# Patient Record
Sex: FEMALE | Race: BLACK OR AFRICAN AMERICAN | NOT HISPANIC OR LATINO | Employment: OTHER | URBAN - METROPOLITAN AREA
[De-identification: names, ages, dates, MRNs, and addresses within clinical notes are randomized per-mention and may not be internally consistent; named-entity substitution may affect disease eponyms.]

---

## 2017-12-30 ENCOUNTER — HOSPITAL ENCOUNTER (INPATIENT)
Facility: HOSPITAL | Age: 82
LOS: 1 days | Discharge: HOME | DRG: 312 | End: 2017-12-31
Attending: EMERGENCY MEDICINE | Admitting: HOSPITALIST
Payer: MEDICARE

## 2017-12-30 DIAGNOSIS — N19 RENAL FAILURE, UNSPECIFIED CHRONICITY: ICD-10-CM

## 2017-12-30 DIAGNOSIS — R55 SYNCOPE, UNSPECIFIED SYNCOPE TYPE: Primary | ICD-10-CM

## 2017-12-30 PROBLEM — D69.6 THROMBOCYTOPENIA: Status: ACTIVE | Noted: 2017-12-30

## 2017-12-30 PROBLEM — I95.1 ORTHOSTATIC HYPOTENSION: Status: ACTIVE | Noted: 2017-12-30

## 2017-12-30 PROBLEM — R79.89 PRERENAL AZOTEMIA: Status: ACTIVE | Noted: 2017-12-30

## 2017-12-30 PROBLEM — I10 ESSENTIAL HYPERTENSION: Status: ACTIVE | Noted: 2017-12-30

## 2017-12-30 PROBLEM — E87.6 HYPOKALEMIA: Status: ACTIVE | Noted: 2017-12-30

## 2017-12-30 PROBLEM — R79.89 ELEVATED SERUM CREATININE: Status: ACTIVE | Noted: 2017-12-30

## 2017-12-30 LAB
ALBUMIN SERPL BCP-MCNC: 2.8 G/DL
ALP SERPL-CCNC: 47 U/L
ALT SERPL W/O P-5'-P-CCNC: 9 U/L
ANION GAP SERPL CALC-SCNC: 13 MMOL/L
AST SERPL-CCNC: 23 U/L
BASOPHILS # BLD AUTO: 0.02 K/UL
BASOPHILS NFR BLD: 0.5 %
BILIRUB SERPL-MCNC: 0.6 MG/DL
BNP SERPL-MCNC: 46 PG/ML
BUN SERPL-MCNC: 40 MG/DL
CALCIUM SERPL-MCNC: 9 MG/DL
CHLORIDE SERPL-SCNC: 102 MMOL/L
CO2 SERPL-SCNC: 23 MMOL/L
CREAT SERPL-MCNC: 2.1 MG/DL
DIFFERENTIAL METHOD: ABNORMAL
EOSINOPHIL # BLD AUTO: 0 K/UL
EOSINOPHIL NFR BLD: 0.3 %
ERYTHROCYTE [DISTWIDTH] IN BLOOD BY AUTOMATED COUNT: 13.3 %
EST. GFR  (AFRICAN AMERICAN): 24 ML/MIN/1.73 M^2
EST. GFR  (NON AFRICAN AMERICAN): 21 ML/MIN/1.73 M^2
GLUCOSE SERPL-MCNC: 134 MG/DL
HCT VFR BLD AUTO: 44.3 %
HGB BLD-MCNC: 13.9 G/DL
LYMPHOCYTES # BLD AUTO: 1.1 K/UL
LYMPHOCYTES NFR BLD: 28.4 %
MCH RBC QN AUTO: 30.8 PG
MCHC RBC AUTO-ENTMCNC: 31.4 G/DL
MCV RBC AUTO: 98 FL
MONOCYTES # BLD AUTO: 0.4 K/UL
MONOCYTES NFR BLD: 9.3 %
NEUTROPHILS # BLD AUTO: 2.4 K/UL
NEUTROPHILS NFR BLD: 61.2 %
PLATELET # BLD AUTO: 95 K/UL
PMV BLD AUTO: 11.9 FL
POCT GLUCOSE: 124 MG/DL (ref 70–110)
POTASSIUM SERPL-SCNC: 3.2 MMOL/L
PROT SERPL-MCNC: 7.3 G/DL
RBC # BLD AUTO: 4.52 M/UL
SODIUM SERPL-SCNC: 138 MMOL/L
TROPONIN I SERPL DL<=0.01 NG/ML-MCNC: 0.05 NG/ML
TROPONIN I SERPL DL<=0.01 NG/ML-MCNC: 0.05 NG/ML
WBC # BLD AUTO: 3.88 K/UL

## 2017-12-30 PROCEDURE — 93010 ELECTROCARDIOGRAM REPORT: CPT | Mod: ,,, | Performed by: INTERNAL MEDICINE

## 2017-12-30 PROCEDURE — 96361 HYDRATE IV INFUSION ADD-ON: CPT

## 2017-12-30 PROCEDURE — 25000003 PHARM REV CODE 250: Performed by: HOSPITALIST

## 2017-12-30 PROCEDURE — 84484 ASSAY OF TROPONIN QUANT: CPT

## 2017-12-30 PROCEDURE — 25000003 PHARM REV CODE 250: Performed by: EMERGENCY MEDICINE

## 2017-12-30 PROCEDURE — 96360 HYDRATION IV INFUSION INIT: CPT

## 2017-12-30 PROCEDURE — 93005 ELECTROCARDIOGRAM TRACING: CPT

## 2017-12-30 PROCEDURE — 93010 ELECTROCARDIOGRAM REPORT: CPT | Mod: 77,S$GLB,, | Performed by: INTERNAL MEDICINE

## 2017-12-30 PROCEDURE — 80053 COMPREHEN METABOLIC PANEL: CPT

## 2017-12-30 PROCEDURE — 83880 ASSAY OF NATRIURETIC PEPTIDE: CPT

## 2017-12-30 PROCEDURE — 99285 EMERGENCY DEPT VISIT HI MDM: CPT | Mod: 25

## 2017-12-30 PROCEDURE — 85025 COMPLETE CBC W/AUTO DIFF WBC: CPT

## 2017-12-30 PROCEDURE — 84484 ASSAY OF TROPONIN QUANT: CPT | Mod: 91

## 2017-12-30 PROCEDURE — 11000001 HC ACUTE MED/SURG PRIVATE ROOM

## 2017-12-30 PROCEDURE — 36415 COLL VENOUS BLD VENIPUNCTURE: CPT

## 2017-12-30 RX ORDER — ASPIRIN 81 MG/1
81 TABLET ORAL DAILY
COMMUNITY

## 2017-12-30 RX ORDER — SODIUM CHLORIDE 9 MG/ML
INJECTION, SOLUTION INTRAVENOUS CONTINUOUS
Status: DISCONTINUED | OUTPATIENT
Start: 2017-12-30 | End: 2017-12-30

## 2017-12-30 RX ORDER — PANTOPRAZOLE SODIUM 40 MG/1
40 TABLET, DELAYED RELEASE ORAL DAILY
Status: DISCONTINUED | OUTPATIENT
Start: 2017-12-31 | End: 2017-12-30

## 2017-12-30 RX ORDER — ONDANSETRON 4 MG/1
4 TABLET, ORALLY DISINTEGRATING ORAL EVERY 8 HOURS PRN
Status: DISCONTINUED | OUTPATIENT
Start: 2017-12-30 | End: 2017-12-31 | Stop reason: HOSPADM

## 2017-12-30 RX ORDER — NITROGLYCERIN 0.4 MG/1
0.4 TABLET SUBLINGUAL
Status: DISCONTINUED | OUTPATIENT
Start: 2017-12-30 | End: 2017-12-30

## 2017-12-30 RX ORDER — POTASSIUM CHLORIDE 20 MEQ/1
20 TABLET, EXTENDED RELEASE ORAL ONCE
Status: COMPLETED | OUTPATIENT
Start: 2017-12-30 | End: 2017-12-30

## 2017-12-30 RX ORDER — ACETAMINOPHEN 325 MG/1
650 TABLET ORAL EVERY 6 HOURS PRN
Status: DISCONTINUED | OUTPATIENT
Start: 2017-12-30 | End: 2017-12-31 | Stop reason: HOSPADM

## 2017-12-30 RX ORDER — GUAIFENESIN 100 MG/5ML
200 SOLUTION ORAL 4 TIMES DAILY PRN
COMMUNITY
End: 2020-01-16

## 2017-12-30 RX ORDER — SODIUM CHLORIDE 9 MG/ML
INJECTION, SOLUTION INTRAVENOUS CONTINUOUS
Status: DISCONTINUED | OUTPATIENT
Start: 2017-12-30 | End: 2017-12-31

## 2017-12-30 RX ORDER — ASPIRIN 81 MG/1
81 TABLET ORAL DAILY
Status: DISCONTINUED | OUTPATIENT
Start: 2017-12-31 | End: 2017-12-31 | Stop reason: HOSPADM

## 2017-12-30 RX ORDER — CARVEDILOL 25 MG/1
1 TABLET ORAL 2 TIMES DAILY
COMMUNITY
End: 2020-01-16 | Stop reason: SDUPTHER

## 2017-12-30 RX ORDER — LOSARTAN POTASSIUM 25 MG/1
1 TABLET ORAL
COMMUNITY
End: 2020-01-16 | Stop reason: SDUPTHER

## 2017-12-30 RX ORDER — FUROSEMIDE 40 MG/1
40 TABLET ORAL DAILY
Status: ON HOLD | COMMUNITY
End: 2017-12-31

## 2017-12-30 RX ORDER — CARVEDILOL 25 MG/1
25 TABLET ORAL 2 TIMES DAILY
Status: DISCONTINUED | OUTPATIENT
Start: 2017-12-30 | End: 2017-12-31 | Stop reason: HOSPADM

## 2017-12-30 RX ADMIN — SODIUM CHLORIDE 1000 ML: 0.9 INJECTION, SOLUTION INTRAVENOUS at 01:12

## 2017-12-30 RX ADMIN — SODIUM CHLORIDE: 0.9 INJECTION, SOLUTION INTRAVENOUS at 08:12

## 2017-12-30 RX ADMIN — CARVEDILOL 25 MG: 25 TABLET, FILM COATED ORAL at 08:12

## 2017-12-30 RX ADMIN — SODIUM CHLORIDE: 0.9 INJECTION, SOLUTION INTRAVENOUS at 06:12

## 2017-12-30 RX ADMIN — POTASSIUM CHLORIDE 20 MEQ: 20 TABLET, EXTENDED RELEASE ORAL at 08:12

## 2017-12-30 RX ADMIN — GUAIFENESIN AND DEXTROMETHORPHAN HYDROBROMIDE 1 TABLET: 600; 30 TABLET, EXTENDED RELEASE ORAL at 08:12

## 2017-12-30 NOTE — ED NOTES
APPEARANCE: Alert, oriented and in no acute distress.  CARDIAC: Normal rate and rhythm, no murmur heard.   PERIPHERAL VASCULAR: peripheral pulses present. Normal cap refill. No edema. Warm to touch.    RESPIRATORY:Normal rate and effort, breath sounds clear bilaterally throughout chest. Respirations are equal and unlabored no obvious signs of distress.  GASTRO: soft, bowel sounds normal, no tenderness, no abdominal distention.  MUSC: Full ROM. No bony tenderness or soft tissue tenderness. No obvious deformity. No palpable head trauma.   SKIN: Skin is warm and dry, normal skin turgor, mucous membranes moist.  NEURO: 5/5 strength major flexors/extensors bilaterally. Sensory intact to light touch bilaterally. [place] coma scale: eyes open spontaneously-4, oriented & converses-5, obeys commands-6. No neurological abnormalities. + LOC  MENTAL STATUS: awake, alert and aware of environment.  EYE: PERRL, both eyes: pupils brisk and reactive to light. Normal size.  ENT: EARS: no obvious drainage. NOSE: no active bleeding.

## 2017-12-30 NOTE — ED PROVIDER NOTES
Encounter Date: 12/30/2017    SCRIBE #1 NOTE: I, Dayron Brown, am scribing for, and in the presence of,  Dr. Madhav Childers. I have scribed the entire note.       History     Chief Complaint   Patient presents with    Loss of Consciousness     pt presents to eD today via EMS. witnessed syncopale episode. pt unresponsive upon EMS arrival . repsonded to painful stimuli. initial BP 80/50 HR 72. pt arrives AAO      This is a 88 y.o. female who has a past medical history of Hypertension.   The patient presents to the Emergency Department via EMS, s/p near syncopal episode.   Daughter explained she woke her up this morning and was normal, made her breakfast and upon her arrival to the kitchen, she was very slow to respond to questions or commands.   Daughter reports pt was sitting up straight, but decreased responsiveness for 30 minutes at which time she called EMS.   Symptoms are also associated with cough, fever for the last 5 days.   Family denies falling syncopal episode, rather a decreased responsiveness and near syncopal episode.  Patient has no prior history of similar symptoms.   No hx of hypoglycemia.  Pt has no past surgical history on file.        The history is provided by the patient and a relative.     Review of patient's allergies indicates:  No Known Allergies  Past Medical History:   Diagnosis Date    Diabetes mellitus     Hypertension      Past Surgical History:   Procedure Laterality Date    CATARACT EXTRACTION EXTRACAPSULAR W/ INTRAOCULAR LENS IMPLANTATION Bilateral      Family History   Problem Relation Age of Onset    Cancer Mother     Heart disease Mother     Stroke Father     COPD Father      Social History   Substance Use Topics    Smoking status: Never Smoker    Smokeless tobacco: Never Used    Alcohol use No     Review of Systems   Constitutional: Negative for fever.   HENT: Positive for congestion and sore throat.    Respiratory: Positive for cough and shortness of breath.     Cardiovascular: Negative for chest pain.   Gastrointestinal: Negative for nausea.   Genitourinary: Negative for dysuria.   Musculoskeletal: Negative for back pain.   Skin: Negative for rash.   Neurological: Positive for syncope (one episode, decreased responsiveness). Negative for dizziness, weakness and light-headedness.   Hematological: Does not bruise/bleed easily.       Physical Exam     Initial Vitals   BP Pulse Resp Temp SpO2   12/30/17 1109 12/30/17 1109 -- 12/30/17 1109 12/30/17 1130   (!) 126/58 74  98.7 °F (37.1 °C) 100 %      MAP       12/30/17 1109       80.67         Physical Exam    Nursing note and vitals reviewed.  Constitutional: She appears well-developed and well-nourished. She is not diaphoretic. No distress.   HENT:   Head: Normocephalic and atraumatic.   Dry oral mucosa   Eyes: Pupils are equal, round, and reactive to light.   Neck: Normal range of motion. Neck supple.   Cardiovascular: Normal rate, regular rhythm and normal heart sounds.   Pulmonary/Chest:   Crackles at bases with wheezing   Abdominal: She exhibits no distension.   Musculoskeletal: Normal range of motion.   Neurological: She is alert.   Oriented x2  Answers questions appropriately.   Skin: Skin is dry.         ED Course   Procedures  Labs Reviewed   CBC W/ AUTO DIFFERENTIAL - Abnormal; Notable for the following:        Result Value    WBC 3.88 (*)     MCHC 31.4 (*)     Platelets 95 (*)     All other components within normal limits   TROPONIN I - Abnormal; Notable for the following:     Troponin I 0.052 (*)     All other components within normal limits   COMPREHENSIVE METABOLIC PANEL - Abnormal; Notable for the following:     Potassium 3.2 (*)     Glucose 134 (*)     BUN, Bld 40 (*)     Creatinine 2.1 (*)     Albumin 2.8 (*)     Alkaline Phosphatase 47 (*)     ALT 9 (*)     eGFR if  24 (*)     eGFR if non  21 (*)     All other components within normal limits   B-TYPE NATRIURETIC PEPTIDE     EKG  Readings: (Independently Interpreted)   sinus rhythm with PAC at HR 67, otherwise nl. no previous EKG on file.     X-Ray Chest PA And Lateral   Final Result    Cardiomegaly likely chronic emphysematous and interstitial changes. Bibasilar opacity, likely atelectasis or scarring. Pneumonitis possible particularly at the left lung base.         Electronically signed by: Dr. Stevan Vega   Date:     12/30/17   Time:    12:36              Medical Decision Making:   History:   Old Medical Records: I decided to obtain old medical records.  Old Records Summarized: records from another hospital.       <> Summary of Records:   prior records from  1/11/17   creatinine 1.3   BUN 23  Initial Assessment:   Presents with near syncopal episode, transient change in Mental status.  Differential: orthostatic hypotension, vasovagal, arrhythmia.  Plan for cbc, CMP, bmp, orthostatics, troponin, cxr, cardiac monitoring  Clinical Tests:   Lab Tests: Ordered and Reviewed  Radiological Study: Ordered and Reviewed  ED Management:  Based on labs, pt has mild acute kidney injury and is slightly orthostatic. Will require fluid resuscitation and cardiac monitoring, possibly echo and syncopal workup, such as carotid dopplers, believe she will need observation.  consult with Dr. Cazares at [facility], who will accept to his service.                       ED Course      Clinical Impression:   The primary encounter diagnosis was Syncope, unspecified syncope type. A diagnosis of Renal failure, unspecified chronicity was also pertinent to this visit.    Disposition:   Disposition: Admitted    I, Dr. Madhav Childers, personally performed the services described in this documentation.   All medical record entries made by the scribe were at my direction and in my presence.   I have reviewed the chart and agree that the record is accurate and complete.   Madhav Childers MD.                      Madhav Childers MD  01/09/18 0973       Madhav PRAKASH  MD [place]  01/09/18 0917

## 2017-12-31 VITALS
WEIGHT: 151 LBS | OXYGEN SATURATION: 92 % | RESPIRATION RATE: 18 BRPM | DIASTOLIC BLOOD PRESSURE: 53 MMHG | TEMPERATURE: 96 F | HEIGHT: 67 IN | BODY MASS INDEX: 23.7 KG/M2 | SYSTOLIC BLOOD PRESSURE: 118 MMHG | HEART RATE: 66 BPM

## 2017-12-31 PROBLEM — N17.9 AKI (ACUTE KIDNEY INJURY): Status: ACTIVE | Noted: 2017-12-30

## 2017-12-31 PROBLEM — R79.89 PRERENAL AZOTEMIA: Status: RESOLVED | Noted: 2017-12-30 | Resolved: 2017-12-31

## 2017-12-31 LAB
ANION GAP SERPL CALC-SCNC: 11 MMOL/L
BUN SERPL-MCNC: 32 MG/DL
CALCIUM SERPL-MCNC: 8.3 MG/DL
CHLORIDE SERPL-SCNC: 106 MMOL/L
CO2 SERPL-SCNC: 23 MMOL/L
CREAT SERPL-MCNC: 1.3 MG/DL
EST. GFR  (AFRICAN AMERICAN): 42 ML/MIN/1.73 M^2
EST. GFR  (NON AFRICAN AMERICAN): 37 ML/MIN/1.73 M^2
GLUCOSE SERPL-MCNC: 96 MG/DL
MAGNESIUM SERPL-MCNC: 1.7 MG/DL
PHOSPHATE SERPL-MCNC: 2.4 MG/DL
POTASSIUM SERPL-SCNC: 3.2 MMOL/L
SODIUM SERPL-SCNC: 140 MMOL/L
TROPONIN I SERPL DL<=0.01 NG/ML-MCNC: 0.05 NG/ML

## 2017-12-31 PROCEDURE — 84484 ASSAY OF TROPONIN QUANT: CPT

## 2017-12-31 PROCEDURE — 80048 BASIC METABOLIC PNL TOTAL CA: CPT

## 2017-12-31 PROCEDURE — 83735 ASSAY OF MAGNESIUM: CPT

## 2017-12-31 PROCEDURE — 84100 ASSAY OF PHOSPHORUS: CPT

## 2017-12-31 PROCEDURE — 94761 N-INVAS EAR/PLS OXIMETRY MLT: CPT

## 2017-12-31 PROCEDURE — 25000003 PHARM REV CODE 250: Performed by: HOSPITALIST

## 2017-12-31 RX ORDER — POTASSIUM CHLORIDE 20 MEQ/1
40 TABLET, EXTENDED RELEASE ORAL ONCE
Status: COMPLETED | OUTPATIENT
Start: 2017-12-31 | End: 2017-12-31

## 2017-12-31 RX ORDER — SODIUM,POTASSIUM PHOSPHATES 280-250MG
2 POWDER IN PACKET (EA) ORAL
Status: DISCONTINUED | OUTPATIENT
Start: 2017-12-31 | End: 2017-12-31 | Stop reason: HOSPADM

## 2017-12-31 RX ORDER — FUROSEMIDE 40 MG/1
40 TABLET ORAL DAILY PRN
Start: 2017-12-31 | End: 2020-01-16 | Stop reason: SDUPTHER

## 2017-12-31 RX ADMIN — GUAIFENESIN AND DEXTROMETHORPHAN HYDROBROMIDE 1 TABLET: 600; 30 TABLET, EXTENDED RELEASE ORAL at 09:12

## 2017-12-31 RX ADMIN — ASPIRIN 81 MG: 81 TABLET, COATED ORAL at 09:12

## 2017-12-31 RX ADMIN — POTASSIUM & SODIUM PHOSPHATES POWDER PACK 280-160-250 MG 2 PACKET: 280-160-250 PACK at 09:12

## 2017-12-31 RX ADMIN — CARVEDILOL 25 MG: 25 TABLET, FILM COATED ORAL at 09:12

## 2017-12-31 RX ADMIN — POTASSIUM CHLORIDE 40 MEQ: 20 TABLET, EXTENDED RELEASE ORAL at 09:12

## 2017-12-31 NOTE — ASSESSMENT & PLAN NOTE
Orthostatic hypotension  Elevated troponin  She has continued taking her home antihypertensives and furosemide while having decreased oral intake and diarrhea. This likely lead to the dehydration and the orthostasis. Will continue IV fluids. Holding home losartan and furosemide. Repeat orthostatics tomorrow. Telemetry monitoring. Trend troponin. No ischemic changes on her ECG. Monitor. Follows with Cardiology at [facility], so will need to follow up with them after discharge.

## 2017-12-31 NOTE — ASSESSMENT & PLAN NOTE
Prerenal azotemia  Do not have a baseline creatinine here. Repeat after giving IV fluids. Encourage oral intake.

## 2017-12-31 NOTE — SUBJECTIVE & OBJECTIVE
Past Medical History:   Diagnosis Date    Diabetes mellitus     Hypertension        Past Surgical History:   Procedure Laterality Date    CATARACT EXTRACTION EXTRACAPSULAR W/ INTRAOCULAR LENS IMPLANTATION Bilateral        Review of patient's allergies indicates:  No Known Allergies    PTA Medications   Medication Sig    aspirin (ECOTRIN) 81 MG EC tablet Take 81 mg by mouth once daily.    carvedilol (COREG) 25 MG tablet Take 1 tablet by mouth 2 (two) times daily.     furosemide (LASIX) 40 MG tablet Take 40 mg by mouth once daily.    guaifenesin 100 mg/5 ml (ROBITUSSIN) 100 mg/5 mL syrup Take 200 mg by mouth 4 (four) times daily as needed for Cough.    losartan (COZAAR) 25 MG tablet Take 1 tablet by mouth with lunch.        Family History     Mom - Cancer, Heart disease  Dad - COPD, Stroke        Social History Main Topics    Smoking status: Never Smoker    Smokeless tobacco: Never Used    Alcohol use No    Drug use: No    Sexual activity: Not on file     Review of Systems   Constitutional: Positive for appetite change. Negative for diaphoresis and fever.   HENT: Positive for congestion. Negative for nosebleeds, sinus pressure and sore throat.    Eyes: Negative for pain and itching.   Respiratory: Positive for cough. Negative for shortness of breath and wheezing.    Cardiovascular: Negative for chest pain, palpitations and leg swelling.   Gastrointestinal: Positive for diarrhea. Negative for abdominal pain, blood in stool, nausea and vomiting.   Endocrine: Negative for cold intolerance, polydipsia and polyphagia.   Genitourinary: Negative for difficulty urinating, dysuria, frequency and hematuria.   Musculoskeletal: Negative for arthralgias, back pain and myalgias.   Skin: Negative for pallor and rash.   Allergic/Immunologic: Negative for environmental allergies and food allergies.   Neurological: Positive for syncope. Negative for dizziness, light-headedness and headaches.   Hematological: Does not  bruise/bleed easily.   Psychiatric/Behavioral: Negative for agitation and confusion. The patient is not nervous/anxious.      Objective:     Vital Signs (Most Recent):  Temp: 99.8 °F (37.7 °C) (12/30/17 1915)  Pulse: 76 (12/30/17 1915)  Resp: 18 (12/30/17 1915)  BP: (!) 176/81 (12/30/17 1915)  SpO2: 96 % (12/30/17 1722) Vital Signs (24h Range):  Temp:  [97.5 °F (36.4 °C)-99.8 °F (37.7 °C)] 99.8 °F (37.7 °C)  Pulse:  [65-78] 76  Resp:  [18-24] 18  SpO2:  [96 %-100 %] 96 %  BP: ()/(45-83) 176/81     Weight: 68.5 kg (151 lb 0.2 oz)  Body mass index is 23.65 kg/m².    Physical Exam   Constitutional: She is oriented to person, place, and time. She appears well-developed. She is cooperative. No distress.   HENT:   Head: Normocephalic and atraumatic.   Right Ear: External ear normal.   Left Ear: External ear normal.   Nose: No mucosal edema or sinus tenderness.   Mouth/Throat: Uvula is midline and oropharynx is clear and moist. Mucous membranes are dry. She has dentures. No oropharyngeal exudate.   Eyes: Conjunctivae and EOM are normal. Pupils are equal, round, and reactive to light. No scleral icterus.   Neck: Phonation normal. Neck supple. No JVD present. No muscular tenderness present. Carotid bruit is not present. No tracheal deviation present.   Cardiovascular: Normal rate, regular rhythm, S1 normal and S2 normal.   Extrasystoles are present.   No murmur heard.  Pulses:       Radial pulses are 2+ on the right side, and 2+ on the left side.   Pulmonary/Chest: Effort normal and breath sounds normal. No respiratory distress. She has no wheezes. She has no rales. She exhibits no tenderness and no crepitus.   Abdominal: Soft. Bowel sounds are normal. She exhibits no distension. There is no tenderness. There is no rebound and no guarding. No hernia.   Musculoskeletal: She exhibits no edema or tenderness.   Lymphadenopathy:        Right cervical: No superficial cervical adenopathy present.       Left cervical: No  superficial cervical adenopathy present.        Right: No supraclavicular adenopathy present.        Left: No supraclavicular adenopathy present.   Neurological: She is alert and oriented to person, place, and time. She displays no tremor. She displays no seizure activity.   Skin: Skin is warm and dry. No rash noted. No cyanosis or erythema. Nails show no clubbing.   Psychiatric: She has a normal mood and affect. Her behavior is normal. Thought content normal.   Nursing note and vitals reviewed.        CRANIAL NERVES     CN III, IV, VI   Pupils are equal, round, and reactive to light.  Extraocular motions are normal.        Significant Labs:   CBC:   Recent Labs  Lab 12/30/17  1227   WBC 3.88*   HGB 13.9   HCT 44.3   PLT 95*     CMP:   Recent Labs  Lab 12/30/17  1306      K 3.2*      CO2 23   *   BUN 40*   CREATININE 2.1*   CALCIUM 9.0   PROT 7.3   ALBUMIN 2.8*   BILITOT 0.6   ALKPHOS 47*   AST 23   ALT 9*   ANIONGAP 13   EGFRNONAA 21*     Cardiac Markers:   Recent Labs  Lab 12/30/17  1306   BNP 46     Troponin:   Recent Labs  Lab 12/30/17  1306   TROPONINI 0.052*       Significant Imaging: CXR: I have reviewed all pertinent results/findings within the past 24 hours and my personal findings are:  No prior available for comparison. Cardiomegaly. Likely chronic institial scarring. Possible bibasilar atelectasis.   EKG: I have reviewed all pertinent results/findings within the past 24 hours and my personal findings are: Sinus rhythm with frequent PACs. No prior for comparison. No ST-T wave changes.

## 2017-12-31 NOTE — DISCHARGE INSTRUCTIONS
Near Syncope, Unknown (English) View Edit Remove  Dehydration (Adult) (English) View Edit Remove

## 2017-12-31 NOTE — PLAN OF CARE
Discharge orders noted, no HH or HME ordered.    Pt's nurse will go over medications/signs and symptoms prior to discharge       12/31/17 1514   Final Note   Assessment Type Final Discharge Note   Discharge Disposition Home   What phone number can be called within the next 1-3 days to see how you are doing after discharge? [phone]   Hospital Follow Up  Appt(s) scheduled? (pt to keep previously scheduled appt)   Right Care Referral Info   Post Acute Recommendation No Care     Katharine Laboy RN Transitional Navigator  [phone]

## 2017-12-31 NOTE — H&P
[facility]  History & Physical    Patient Name: Daria Carter  MRN: [id number]  Admission Date: 12/30/2017  Attending Physician: Gabino Cazares MD  Primary Care Provider: Doreen Kim MD         Patient information was obtained from patient, relative(s) and ER records.     Subjective:     Principal Problem:Syncope    Chief Complaint:   Chief Complaint   Patient presents with    Loss of Consciousness     pt presents to eD today via EMS. witnessed syncopale episode. pt unresponsive upon EMS arrival . repsonded to painful stimuli. initial BP 80/50 HR 72. pt arrives AAO         HPI: Ms. Carter is an 89 yo BF with HTN that [place] [facility] [place] have syncopal event at home. She says that she remembers walking from her room to the table for breakfast. She also reports remembering not feeding herself, but her daughter fed her everything and she ate it. She says that the next thing that she remembers is waking up with several men around her. She remembers hearing some of the conversation, but she was not talking much at that time. Ultimately, she was back to her normal self by the time she arrived at the ED. She reports having a cold for the last week and has been taking mucinex liquid at home for it. She had subjective fever until a couple days ago. Also had decreased appetite and oral intake over this time frame. She says that she had loose stools since starting the mucinex. She denies any chest pain or palpitations.     Past Medical History:   Diagnosis Date    Diabetes mellitus     Hypertension        Past Surgical History:   Procedure Laterality Date    CATARACT EXTRACTION EXTRACAPSULAR W/ INTRAOCULAR LENS IMPLANTATION Bilateral        Review of patient's allergies indicates:  No Known Allergies    PTA Medications   Medication Sig    aspirin (ECOTRIN) 81 MG EC tablet Take 81 mg by mouth once daily.    carvedilol (COREG) 25 MG tablet Take 1 tablet by mouth 2 (two) times  daily.     furosemide (LASIX) 40 MG tablet Take 40 mg by mouth once daily.    guaifenesin 100 mg/5 ml (ROBITUSSIN) 100 mg/5 mL syrup Take 200 mg by mouth 4 (four) times daily as needed for Cough.    losartan (COZAAR) 25 MG tablet Take 1 tablet by mouth with lunch.        Family History     Mom - Cancer, Heart disease  Dad - COPD, Stroke        Social History Main Topics    Smoking status: Never Smoker    Smokeless tobacco: Never Used    Alcohol use No    Drug use: No    Sexual activity: Not on file     Review of Systems   Constitutional: Positive for appetite change. Negative for diaphoresis and fever.   HENT: Positive for congestion. Negative for nosebleeds, sinus pressure and sore throat.    Eyes: Negative for pain and itching.   Respiratory: Positive for cough. Negative for shortness of breath and wheezing.    Cardiovascular: Negative for chest pain, palpitations and leg swelling.   Gastrointestinal: Positive for diarrhea. Negative for abdominal pain, blood in stool, nausea and vomiting.   Endocrine: Negative for cold intolerance, polydipsia and polyphagia.   Genitourinary: Negative for difficulty urinating, dysuria, frequency and hematuria.   Musculoskeletal: Negative for arthralgias, back pain and myalgias.   Skin: Negative for pallor and rash.   Allergic/Immunologic: Negative for environmental allergies and food allergies.   Neurological: Positive for syncope. Negative for dizziness, light-headedness and headaches.   Hematological: Does not bruise/bleed easily.   Psychiatric/Behavioral: Negative for agitation and confusion. The patient is not nervous/anxious.      Objective:     Vital Signs (Most Recent):  Temp: 99.8 °F (37.7 °C) (12/30/17 1915)  Pulse: 76 (12/30/17 1915)  Resp: 18 (12/30/17 1915)  BP: (!) 176/81 (12/30/17 1915)  SpO2: 96 % (12/30/17 1722) Vital Signs (24h Range):  Temp:  [97.5 °F (36.4 °C)-99.8 °F (37.7 °C)] 99.8 °F (37.7 °C)  Pulse:  [65-78] 76  Resp:  [18-24] 18  SpO2:  [96 %-100 %]  96 %  BP: ()/(45-83) 176/81     Weight: 68.5 kg (151 lb 0.2 oz)  Body mass index is 23.65 kg/m².    Physical Exam   Constitutional: She is oriented to person, place, and time. She appears well-developed. She is cooperative. No distress.   HENT:   Head: Normocephalic and atraumatic.   Right Ear: External ear normal.   Left Ear: External ear normal.   Nose: No mucosal edema or sinus tenderness.   Mouth/Throat: Uvula is midline and oropharynx is clear and moist. Mucous membranes are dry. She has dentures. No oropharyngeal exudate.   Eyes: Conjunctivae and EOM are normal. Pupils are equal, round, and reactive to light. No scleral icterus.   Neck: Phonation normal. Neck supple. No JVD present. No muscular tenderness present. Carotid bruit is not present. No tracheal deviation present.   Cardiovascular: Normal rate, regular rhythm, S1 normal and S2 normal.   Extrasystoles are present.   No murmur heard.  Pulses:       Radial pulses are 2+ on the right side, and 2+ on the left side.   Pulmonary/Chest: Effort normal and breath sounds normal. No respiratory distress. She has no wheezes. She has no rales. She exhibits no tenderness and no crepitus.   Abdominal: Soft. Bowel sounds are normal. She exhibits no distension. There is no tenderness. There is no rebound and no guarding. No hernia.   Musculoskeletal: She exhibits no edema or tenderness.   Lymphadenopathy:        Right cervical: No superficial cervical adenopathy present.       Left cervical: No superficial cervical adenopathy present.        Right: No supraclavicular adenopathy present.        Left: No supraclavicular adenopathy present.   Neurological: She is alert and oriented to person, place, and time. She displays no tremor. She displays no seizure activity.   Skin: Skin is warm and dry. No rash noted. No cyanosis or erythema. Nails show no clubbing.   Psychiatric: She has a normal mood and affect. Her behavior is normal. Thought content normal.   Nursing  note and vitals reviewed.        CRANIAL NERVES     CN III, IV, VI   Pupils are equal, round, and reactive to light.  Extraocular motions are normal.        Significant Labs:   CBC:   Recent Labs  Lab 12/30/17  1227   WBC 3.88*   HGB 13.9   HCT 44.3   PLT 95*     CMP:   Recent Labs  Lab 12/30/17  1306      K 3.2*      CO2 23   *   BUN 40*   CREATININE 2.1*   CALCIUM 9.0   PROT 7.3   ALBUMIN 2.8*   BILITOT 0.6   ALKPHOS 47*   AST 23   ALT 9*   ANIONGAP 13   EGFRNONAA 21*     Cardiac Markers:   Recent Labs  Lab 12/30/17  1306   BNP 46     Troponin:   Recent Labs  Lab 12/30/17  1306   TROPONINI 0.052*       Significant Imaging: CXR: I have reviewed all pertinent results/findings within the past 24 hours and my personal findings are:  No prior available for comparison. Cardiomegaly. Likely chronic institial scarring. Possible bibasilar atelectasis.   EKG: I have reviewed all pertinent results/findings within the past 24 hours and my personal findings are: Sinus rhythm with frequent PACs. No prior for comparison. No ST-T wave changes.    Assessment/Plan:     * Syncope    Orthostatic hypotension  Elevated troponin  She has continued taking her home antihypertensives and furosemide while having decreased oral intake and diarrhea. This likely lead to the dehydration and the orthostasis. Will continue IV fluids. Holding home losartan and furosemide. Repeat orthostatics tomorrow. Telemetry monitoring. Trend troponin. No ischemic changes on her ECG. Monitor. Follows with Cardiology at [facility], so will need to follow up with them after discharge.         Thrombocytopenia    Unknown baseline. No sign of bleeding. Monitor.           Elevated serum creatinine    Prerenal azotemia  Do not have a baseline creatinine here. Repeat after giving IV fluids. Encourage oral intake.         Hypokalemia    Give KCl 20 mEq with her elevated creatinine.           Essential hypertension    Holding home losartan and furosemide.  Continue carvedilol.           VTE Risk Mitigation         Ordered     Medium Risk of VTE  Once      12/30/17 1712     Place sequential compression device  Until discontinued      12/30/17 1712     Place CESILIA hose  Until discontinued      12/30/17 1712             Gabino Cazares MD  Department of Hospital Medicine   [facility]

## 2018-01-02 DIAGNOSIS — R55 SYNCOPE: Primary | ICD-10-CM

## 2018-01-03 NOTE — ASSESSMENT & PLAN NOTE
Orthostatic hypotension  Elevated troponin  She has continued taking her home antihypertensives and furosemide while having decreased oral intake and diarrhea. This likely lead to the dehydration and the orthostasis. Holding home losartan and furosemide. Telemetry monitoring was negative for any events. Troponin was negative. No ischemic changes on her ECG. Follows with Cardiology at [facility], so will need to follow up with them after discharge.

## 2018-01-03 NOTE — HOSPITAL COURSE
Given gentle IV hydration overnight. Blood pressure medications were reduced and furosemide was held. No events were noted on telemetry. Creatinine improved to 1.3 with holding the medications (furosemide and losartan) and the IV fluids. She was noted to be orthostatic by blood pressure changes but she denied symptoms. Will hold furosemide on discharge and resume losartan in a couple days. Will follow up with her PCP/Cardiologist.

## 2018-01-10 PROBLEM — N17.9 AKI (ACUTE KIDNEY INJURY): Status: RESOLVED | Noted: 2017-12-30 | Resolved: 2018-01-10

## 2018-01-10 PROBLEM — E87.6 HYPOKALEMIA: Status: RESOLVED | Noted: 2017-12-30 | Resolved: 2018-01-10

## 2018-01-10 NOTE — DISCHARGE SUMMARY
[facility] Hospital Medicine  Discharge Summary      Patient Name: Daria Carter  MRN: [id number]  Admission Date: 12/30/2017  Hospital Length of Stay: 1 days  Discharge Date and Time: 12/31/2017  4:19 PM  Attending Physician: Gabino Cazares MD   Discharging Provider: Gabino Cazares MD  Primary Care Provider: Doreen Kim MD      HPI:   Ms. Carter is an 89 yo BF with HTN that presented to [facility]- after have syncopal event at home. She says that she remembers walking from her room to the table for breakfast. She also reports remembering not feeding herself, but her daughter fed her everything and she ate it. She says that the next thing that she remembers is waking up with several men around her. She remembers hearing some of the conversation, but she was not talking much at that time. Ultimately, she was back to her normal self by the time she arrived at the ED. She reports having a cold for the last week and has been taking mucinex liquid at home for it. She had subjective fever until a couple days ago. Also had decreased appetite and oral intake over this time frame. She says that she had loose stools since starting the mucinex. She denies any chest pain or palpitations.     * No surgery found *      Hospital Course:   Given gentle IV hydration overnight. Blood pressure medications were reduced and furosemide was held. No events were noted on telemetry. Creatinine improved to 1.3 with holding the medications (furosemide and losartan) and the IV fluids. She was noted to be orthostatic by blood pressure changes but she denied symptoms. Will hold furosemide on discharge and resume losartan in a couple days. Will follow up with her PCP/Cardiologist.      Consults:     * Syncope    Orthostatic hypotension  Elevated troponin  She has continued taking her home antihypertensives and furosemide while having decreased oral intake and diarrhea. This likely lead to the dehydration and the orthostasis.  Holding home losartan and furosemide. Telemetry monitoring was negative for any events. Troponin was negative. No ischemic changes on her ECG. Follows with Cardiology at [facility], so will need to follow up with them after discharge.           Final Active Diagnoses:    Diagnosis Date Noted POA    PRINCIPAL PROBLEM:  Syncope [R55] 12/30/2017 Yes    Essential hypertension [I10] 12/30/2017 Yes    Orthostatic hypotension [I95.1] 12/30/2017 Yes    Thrombocytopenia [D69.6] 12/30/2017 Yes      Problems Resolved During this Admission:    Diagnosis Date Noted Date Resolved POA    Hypokalemia [E87.6] 12/30/2017 01/10/2018 Yes    SERGE (acute kidney injury) [N17.9] 12/30/2017 01/10/2018 Yes    Prerenal azotemia [R79.89] 12/30/2017 12/31/2017 Yes       Discharged Condition: good    Disposition: Home or Self Care    Follow Up:  Follow-up Information     Doreen Kim MD On 1/19/2018.    Specialty:  Cardiology  Why:  as previously scheduled.   Contact information:  [address] [place], [place] [id number] [phone]                 Patient Instructions:     Diet Cardiac (2gm sodium and 60gm fat)     Activity as tolerated     Notify your health care provider if you experience any of the following:  temperature >100.4     Notify your health care provider if you experience any of the following:  persistent nausea and vomiting or diarrhea     Notify your health care provider if you experience any of the following:  difficulty breathing or increased cough     Notify your health care provider if you experience any of the following:  persistent dizziness, light-headedness, or visual disturbances     Notify your health care provider if you experience any of the following:  increased confusion or weakness         Significant Diagnostic Studies: Radiology:   Imaging Results          X-Ray Chest PA And Lateral (Final result)  Result time 12/30/17 12:36:29    Final result by Stevan Vega MD (12/30/17 12:36:29)                  Impression:     Cardiomegaly likely chronic emphysematous and interstitial changes. Bibasilar opacity, likely atelectasis or scarring. Pneumonitis possible particularly at the left lung base.      Electronically signed by: Dr. Stevan Vega  Date:     12/30/17  Time:    12:36              Narrative:    Single view chest    Findings: Cardiac silhouette is enlarged. Mediastinum unremarkable. Lungs demonstrate suspected emphysematous changes with diffuse interstitial coarsening. Basilar opacity is present. Osseous structures are osteopenic.                                Pending Diagnostic Studies:     None         Medications:  Reconciled Home Medications:   Discharge Medication List as of 12/31/2017  3:59 PM      CONTINUE these medications which have CHANGED    Details   furosemide (LASIX) 40 MG tablet Take 1 tablet (40 mg total) by mouth daily as needed (for swelling or weight gain of 5 lbs.)., Starting Sun 12/31/2017, No Print         CONTINUE these medications which have NOT CHANGED    Details   aspirin (ECOTRIN) 81 MG EC tablet Take 81 mg by mouth once daily., Historical Med      carvedilol (COREG) 25 MG tablet Take 1 tablet by mouth 2 (two) times daily. , Historical Med      guaifenesin 100 mg/5 ml (ROBITUSSIN) 100 mg/5 mL syrup Take 200 mg by mouth 4 (four) times daily as needed for Cough., Historical Med      losartan (COZAAR) 25 MG tablet Take 1 tablet by mouth with lunch. , Historical Med             Indwelling Lines/Drains at time of discharge:   Lines/Drains/Airways          No matching active lines, drains, or airways          Time spent on the discharge of patient: 35 minutes  Patient was seen and examined on the date of discharge and determined to be suitable for discharge.         Gabino Cazares MD  Department of Hospital Medicine  [facility]

## 2020-01-16 ENCOUNTER — OFFICE VISIT (OUTPATIENT)
Dept: FAMILY MEDICINE | Facility: CLINIC | Age: 85
End: 2020-01-16
Payer: MEDICARE

## 2020-01-16 VITALS
WEIGHT: 131.94 LBS | HEART RATE: 62 BPM | BODY MASS INDEX: 20.67 KG/M2 | DIASTOLIC BLOOD PRESSURE: 88 MMHG | TEMPERATURE: 98 F | SYSTOLIC BLOOD PRESSURE: 140 MMHG | OXYGEN SATURATION: 93 %

## 2020-01-16 DIAGNOSIS — I10 ESSENTIAL HYPERTENSION: ICD-10-CM

## 2020-01-16 LAB
ALBUMIN SERPL BCP-MCNC: 3.5 G/DL (ref 3.5–5.2)
ALP SERPL-CCNC: 54 U/L (ref 55–135)
ALT SERPL W/O P-5'-P-CCNC: 7 U/L (ref 10–44)
ANION GAP SERPL CALC-SCNC: 9 MMOL/L (ref 8–16)
AST SERPL-CCNC: 22 U/L (ref 10–40)
BASOPHILS # BLD AUTO: 0.05 K/UL (ref 0–0.2)
BASOPHILS NFR BLD: 1.1 % (ref 0–1.9)
BILIRUB SERPL-MCNC: 0.8 MG/DL (ref 0.1–1)
BUN SERPL-MCNC: 18 MG/DL (ref 8–23)
CALCIUM SERPL-MCNC: 9.5 MG/DL (ref 8.7–10.5)
CHLORIDE SERPL-SCNC: 102 MMOL/L (ref 95–110)
CHOLEST SERPL-MCNC: 167 MG/DL (ref 120–199)
CHOLEST/HDLC SERPL: 4.1 {RATIO} (ref 2–5)
CO2 SERPL-SCNC: 28 MMOL/L (ref 23–29)
CREAT SERPL-MCNC: 1.3 MG/DL (ref 0.5–1.4)
DIFFERENTIAL METHOD: ABNORMAL
EOSINOPHIL # BLD AUTO: 0.2 K/UL (ref 0–0.5)
EOSINOPHIL NFR BLD: 4.4 % (ref 0–8)
ERYTHROCYTE [DISTWIDTH] IN BLOOD BY AUTOMATED COUNT: 13.1 % (ref 11.5–14.5)
EST. GFR  (AFRICAN AMERICAN): 41.7 ML/MIN/1.73 M^2
EST. GFR  (NON AFRICAN AMERICAN): 36.2 ML/MIN/1.73 M^2
GLUCOSE SERPL-MCNC: 97 MG/DL (ref 70–110)
HCT VFR BLD AUTO: 39.4 % (ref 37–48.5)
HDLC SERPL-MCNC: 41 MG/DL (ref 40–75)
HDLC SERPL: 24.6 % (ref 20–50)
HGB BLD-MCNC: 12.2 G/DL (ref 12–16)
IMM GRANULOCYTES # BLD AUTO: 0 K/UL (ref 0–0.04)
IMM GRANULOCYTES NFR BLD AUTO: 0 % (ref 0–0.5)
LDLC SERPL CALC-MCNC: 113 MG/DL (ref 63–159)
LYMPHOCYTES # BLD AUTO: 1.2 K/UL (ref 1–4.8)
LYMPHOCYTES NFR BLD: 27.6 % (ref 18–48)
MCH RBC QN AUTO: 31.5 PG (ref 27–31)
MCHC RBC AUTO-ENTMCNC: 31 G/DL (ref 32–36)
MCV RBC AUTO: 102 FL (ref 82–98)
MONOCYTES # BLD AUTO: 0.5 K/UL (ref 0.3–1)
MONOCYTES NFR BLD: 10.6 % (ref 4–15)
NEUTROPHILS # BLD AUTO: 2.5 K/UL (ref 1.8–7.7)
NEUTROPHILS NFR BLD: 56.3 % (ref 38–73)
NONHDLC SERPL-MCNC: 126 MG/DL
NRBC BLD-RTO: 0 /100 WBC
PLATELET # BLD AUTO: 125 K/UL (ref 150–350)
PMV BLD AUTO: 12.5 FL (ref 9.2–12.9)
POTASSIUM SERPL-SCNC: 4.1 MMOL/L (ref 3.5–5.1)
PROT SERPL-MCNC: 7.6 G/DL (ref 6–8.4)
RBC # BLD AUTO: 3.87 M/UL (ref 4–5.4)
SODIUM SERPL-SCNC: 139 MMOL/L (ref 136–145)
TRIGL SERPL-MCNC: 65 MG/DL (ref 30–150)
WBC # BLD AUTO: 4.35 K/UL (ref 3.9–12.7)

## 2020-01-16 PROCEDURE — 80061 LIPID PANEL: CPT

## 2020-01-16 PROCEDURE — 1101F PT FALLS ASSESS-DOCD LE1/YR: CPT | Mod: CPTII,S$GLB,, | Performed by: INTERNAL MEDICINE

## 2020-01-16 PROCEDURE — 1101F PR PT FALLS ASSESS DOC 0-1 FALLS W/OUT INJ PAST YR: ICD-10-PCS | Mod: CPTII,S$GLB,, | Performed by: INTERNAL MEDICINE

## 2020-01-16 PROCEDURE — 1126F PR PAIN SEVERITY QUANTIFIED, NO PAIN PRESENT: ICD-10-PCS | Mod: S$GLB,,, | Performed by: INTERNAL MEDICINE

## 2020-01-16 PROCEDURE — 80053 COMPREHEN METABOLIC PANEL: CPT

## 2020-01-16 PROCEDURE — 99999 PR PBB SHADOW E&M-EST. PATIENT-LVL III: CPT | Mod: PBBFAC,,, | Performed by: INTERNAL MEDICINE

## 2020-01-16 PROCEDURE — 1159F PR MEDICATION LIST DOCUMENTED IN MEDICAL RECORD: ICD-10-PCS | Mod: S$GLB,,, | Performed by: INTERNAL MEDICINE

## 2020-01-16 PROCEDURE — 99214 OFFICE O/P EST MOD 30 MIN: CPT | Mod: S$GLB,,, | Performed by: INTERNAL MEDICINE

## 2020-01-16 PROCEDURE — 1126F AMNT PAIN NOTED NONE PRSNT: CPT | Mod: S$GLB,,, | Performed by: INTERNAL MEDICINE

## 2020-01-16 PROCEDURE — 36415 COLL VENOUS BLD VENIPUNCTURE: CPT

## 2020-01-16 PROCEDURE — 99214 PR OFFICE/OUTPT VISIT, EST, LEVL IV, 30-39 MIN: ICD-10-PCS | Mod: S$GLB,,, | Performed by: INTERNAL MEDICINE

## 2020-01-16 PROCEDURE — 85025 COMPLETE CBC W/AUTO DIFF WBC: CPT

## 2020-01-16 PROCEDURE — 1159F MED LIST DOCD IN RCRD: CPT | Mod: S$GLB,,, | Performed by: INTERNAL MEDICINE

## 2020-01-16 PROCEDURE — 99999 PR PBB SHADOW E&M-EST. PATIENT-LVL III: ICD-10-PCS | Mod: PBBFAC,,, | Performed by: INTERNAL MEDICINE

## 2020-01-16 RX ORDER — LOSARTAN POTASSIUM 25 MG/1
25 TABLET ORAL
Qty: 90 TABLET | Refills: 3 | Status: ON HOLD | OUTPATIENT
Start: 2020-01-16 | End: 2020-07-15 | Stop reason: SDUPTHER

## 2020-01-16 RX ORDER — CARVEDILOL 25 MG/1
25 TABLET ORAL 2 TIMES DAILY
Qty: 180 TABLET | Refills: 3 | Status: SHIPPED | OUTPATIENT
Start: 2020-01-16

## 2020-01-16 RX ORDER — FUROSEMIDE 40 MG/1
40 TABLET ORAL DAILY PRN
Qty: 90 TABLET | Refills: 3 | Status: ON HOLD | OUTPATIENT
Start: 2020-01-16 | End: 2020-07-15 | Stop reason: HOSPADM

## 2020-01-16 NOTE — PROGRESS NOTES
[facility] Note    Chief Complaint      Chief Complaint   Patient presents with    Establish Care     f/u from [facility]      History of Present Illness      Daria Carter is a 89 y.o. female who presents today to establish care for HTN.  Patient comes to appointment alone.     No falls at home, walks unassisted.  Some days she feels off balance/not sturdy. Daughter says all she does is eat and get in bed. Goes to [employer] several days per week.  Problem List Items Addressed This Visit     Essential hypertension    Current Assessment & Plan     BP elevated today on Lasix 40 mg daily, Coreg 25 mg, Losartan 25 mg daily.  Was in hospital for orthostatic syncope in 2018, ok with slightly higher BP goal.         Relevant Medications    furosemide (LASIX) 40 MG tablet    carvedilol (COREG) 25 MG tablet    losartan (COZAAR) 25 MG tablet    Other Relevant Orders    CBC auto differential    Lipid panel    Comprehensive metabolic panel        Health Maintenance   Topic Date Due    Lipid Panel  11/11/1929    TETANUS VACCINE  01/16/2021 (Originally 11/11/1947)    Pneumococcal Vaccine (65+ Low/Medium Risk) (1 of 2 - PCV13) 01/16/2021 (Originally 11/11/1994)       Past Medical History:   Diagnosis Date    Diabetes mellitus     Hypertension        Past Surgical History:   Procedure Laterality Date    CATARACT EXTRACTION EXTRACAPSULAR W/ INTRAOCULAR LENS IMPLANTATION Bilateral        family history includes COPD in her father; Cancer in her mother; Heart disease in her mother; Stroke in her father.    Social History     Tobacco Use    Smoking status: Never Smoker    Smokeless tobacco: Never Used   Substance Use Topics    Alcohol use: No    Drug use: No       Review of Systems   Constitutional: Negative for chills and fever.   HENT: Negative for congestion and sore throat.    Eyes: Negative for blurred vision and discharge.   Respiratory: Negative for cough and shortness of breath.    Cardiovascular:  Negative for chest pain and palpitations.   Gastrointestinal: Negative for constipation, diarrhea, nausea and vomiting.   Genitourinary: Negative for dysuria and hematuria.   Musculoskeletal: Negative for falls and myalgias.   Skin: Negative for itching and rash.   Neurological: Negative for dizziness and headaches.        Outpatient Encounter Medications as of 1/16/2020   Medication Sig Dispense Refill    aspirin (ECOTRIN) 81 MG EC tablet Take 81 mg by mouth once daily.      carvedilol (COREG) 25 MG tablet Take 1 tablet (25 mg total) by mouth 2 (two) times daily. 180 tablet 3    furosemide (LASIX) 40 MG tablet Take 1 tablet (40 mg total) by mouth daily as needed (for swelling or weight gain of 5 lbs.). 90 tablet 3    losartan (COZAAR) 25 MG tablet Take 1 tablet (25 mg total) by mouth with lunch. 90 tablet 3    [DISCONTINUED] carvedilol (COREG) 25 MG tablet Take 1 tablet by mouth 2 (two) times daily.       [DISCONTINUED] furosemide (LASIX) 40 MG tablet Take 1 tablet (40 mg total) by mouth daily as needed (for swelling or weight gain of 5 lbs.).      [DISCONTINUED] losartan (COZAAR) 25 MG tablet Take 1 tablet by mouth with lunch.       [DISCONTINUED] guaifenesin 100 mg/5 ml (ROBITUSSIN) 100 mg/5 mL syrup Take 200 mg by mouth 4 (four) times daily as needed for Cough.       No facility-administered encounter medications on file as of 1/16/2020.         Review of patient's allergies indicates:  No Known Allergies    Physical Exam      Vital Signs  Temp: 98.1 °F (36.7 °C)  Temp src: Oral  Pulse: 62  SpO2: (!) 93 %  BP: (!) 140/88  BP Location: Left arm  Patient Position: Sitting  Pain Score: 0-No pain  Height and Weight  Weight: 59.8 kg (131 lb 15.1 oz)]    Physical Exam   Constitutional: She is oriented to person, place, and time. She appears well-developed and well-nourished.   HENT:   Head: Normocephalic and atraumatic.   Right Ear: External ear normal.   Left Ear: External ear normal.   Eyes: Right eye  exhibits no discharge. Left eye exhibits no discharge.   Neck: Normal range of motion. No thyromegaly present.   Cardiovascular: Normal rate, regular rhythm, normal heart sounds and intact distal pulses.   No murmur heard.  Pulmonary/Chest: Effort normal and breath sounds normal. No respiratory distress.   Abdominal: Soft. Bowel sounds are normal. She exhibits no distension. There is no tenderness.   Musculoskeletal: Normal range of motion. She exhibits no deformity.   Neurological: She is alert and oriented to person, place, and time.   Skin: Skin is warm and dry. No rash noted.   Psychiatric: She has a normal mood and affect. Her behavior is normal.        Laboratory:  CBC:  No results for input(s): WBC, RBC, HGB, HCT, PLT, MCV, MCH, MCHC in the last 2160 hours.  CMP:  No results for input(s): GLU, CALCIUM, ALBUMIN, PROT, NA, K, CO2, CL, BUN, ALKPHOS, ALT, AST, BILITOT in the last 2160 hours.    Invalid input(s): CREATININ  URINALYSIS:  No results for input(s): COLORU, CLARITYU, SPECGRAV, PHUR, PROTEINUA, GLUCOSEU, BILIRUBINCON, BLOODU, WBCU, RBCU, BACTERIA, MUCUS, NITRITE, LEUKOCYTESUR, UROBILINOGEN, HYALINECASTS in the last 2160 hours.   LIPIDS:  No results for input(s): TSH, HDL, CHOL, TRIG, LDLCALC, CHOLHDL, NONHDLCHOL, TOTALCHOLEST in the last 2160 hours.  TSH:  No results for input(s): TSH in the last 2160 hours.  A1C:  No results for input(s): HGBA1C in the last 2160 hours.    Radiology:  No new imaging on file    Assessment/Plan     Daria Carter is a 89 y.o.female with:    1. Essential hypertension  - furosemide (LASIX) 40 MG tablet; Take 1 tablet (40 mg total) by mouth daily as needed (for swelling or weight gain of 5 lbs.).  Dispense: 90 tablet; Refill: 3  - carvedilol (COREG) 25 MG tablet; Take 1 tablet (25 mg total) by mouth 2 (two) times daily.  Dispense: 180 tablet; Refill: 3  - losartan (COZAAR) 25 MG tablet; Take 1 tablet (25 mg total) by mouth with lunch.  Dispense: 90 tablet; Refill: 3  - CBC  auto differential  - Lipid panel  - Comprehensive metabolic panel    -counseled to increase exercise, will try to drink 2 Ensure per day  -Continue current medications and maintain follow up with specialists.  Return to clinic in 6 months.      Vanessa Atkins MD  [facility]

## 2020-01-16 NOTE — ASSESSMENT & PLAN NOTE
BP elevated today on Lasix 40 mg daily, Coreg 25 mg, Losartan 25 mg daily.  Was in hospital for orthostatic syncope in 2018, ok with slightly higher BP goal.

## 2020-02-20 ENCOUNTER — TELEPHONE (OUTPATIENT)
Dept: FAMILY MEDICINE | Facility: CLINIC | Age: 85
End: 2020-02-20

## 2020-02-20 NOTE — TELEPHONE ENCOUNTER
Spoke with patient's daughter, Mei. Mei states that patient complains of not being able to see out of one of her eyes, and would like a referral to go see an eye doctor. Informed Mei that she does not need a referral to go to the eye doctor and they could bring her to which ever doctor they choose. Mei verbalizes understanding.

## 2020-02-20 NOTE — TELEPHONE ENCOUNTER
----- Message from Yomaira Chandler sent at 2/20/2020  9:55 AM CST -----  Contact: Daughter [phone]  Patient would like to speak with you about not being able to see how one of her eyes and needs an eye Dr referral. Please advise

## 2020-06-10 ENCOUNTER — TELEPHONE (OUTPATIENT)
Dept: FAMILY MEDICINE | Facility: CLINIC | Age: 85
End: 2020-06-10

## 2020-06-10 RX ORDER — PANTOPRAZOLE SODIUM 40 MG/1
40 TABLET, DELAYED RELEASE ORAL DAILY
Qty: 30 TABLET | Refills: 11 | Status: SHIPPED | OUTPATIENT
Start: 2020-06-10 | End: 2021-06-10

## 2020-06-10 NOTE — TELEPHONE ENCOUNTER
----- Message from Dayana Maguire sent at 6/10/2020  1:52 PM CDT -----  Contact: Daughter Mei [phone]  Patient's daughter is requesting to speak with nurse regarding some health concerns. Please advise.

## 2020-06-10 NOTE — TELEPHONE ENCOUNTER
Mei states pt is having a hard time catching her breath in the mornings and at evenings. Said when she eats, her food gets stuck in her esophagus and every time she belches, her food comes up. Said she is also hallucinating and talking to people who aren't there. Do you want pt to come in for an appt?

## 2020-06-10 NOTE — TELEPHONE ENCOUNTER
----- Message from Jaz Lainez sent at 6/10/2020 12:40 PM CDT -----  Contact: le [phone] Mei  Patient called in requesting to speak with you. Patient prefers to speak with a nurse. Please advise.

## 2020-06-11 ENCOUNTER — OFFICE VISIT (OUTPATIENT)
Dept: FAMILY MEDICINE | Facility: CLINIC | Age: 85
End: 2020-06-11
Payer: MEDICARE

## 2020-06-11 ENCOUNTER — TELEPHONE (OUTPATIENT)
Dept: FAMILY MEDICINE | Facility: CLINIC | Age: 85
End: 2020-06-11

## 2020-06-11 VITALS
OXYGEN SATURATION: 89 % | SYSTOLIC BLOOD PRESSURE: 140 MMHG | DIASTOLIC BLOOD PRESSURE: 86 MMHG | TEMPERATURE: 97 F | HEART RATE: 76 BPM

## 2020-06-11 DIAGNOSIS — K22.2 ESOPHAGEAL OBSTRUCTION: ICD-10-CM

## 2020-06-11 DIAGNOSIS — R41.0 CONFUSION: Primary | ICD-10-CM

## 2020-06-11 DIAGNOSIS — R06.02 SHORTNESS OF BREATH: ICD-10-CM

## 2020-06-11 DIAGNOSIS — F03.91 DEMENTIA WITH BEHAVIORAL DISTURBANCE, UNSPECIFIED DEMENTIA TYPE: ICD-10-CM

## 2020-06-11 DIAGNOSIS — R13.10 DYSPHAGIA, UNSPECIFIED TYPE: Primary | ICD-10-CM

## 2020-06-11 DIAGNOSIS — R60.0 LOCALIZED EDEMA: ICD-10-CM

## 2020-06-11 DIAGNOSIS — R13.10 DYSPHAGIA, UNSPECIFIED TYPE: ICD-10-CM

## 2020-06-11 PROCEDURE — 1126F PR PAIN SEVERITY QUANTIFIED, NO PAIN PRESENT: ICD-10-PCS | Mod: S$GLB,,, | Performed by: INTERNAL MEDICINE

## 2020-06-11 PROCEDURE — 99214 PR OFFICE/OUTPT VISIT, EST, LEVL IV, 30-39 MIN: ICD-10-PCS | Mod: S$GLB,,, | Performed by: INTERNAL MEDICINE

## 2020-06-11 PROCEDURE — 1159F MED LIST DOCD IN RCRD: CPT | Mod: S$GLB,,, | Performed by: INTERNAL MEDICINE

## 2020-06-11 PROCEDURE — 1126F AMNT PAIN NOTED NONE PRSNT: CPT | Mod: S$GLB,,, | Performed by: INTERNAL MEDICINE

## 2020-06-11 PROCEDURE — 99999 PR PBB SHADOW E&M-EST. PATIENT-LVL III: CPT | Mod: PBBFAC,,, | Performed by: INTERNAL MEDICINE

## 2020-06-11 PROCEDURE — 99999 PR PBB SHADOW E&M-EST. PATIENT-LVL III: ICD-10-PCS | Mod: PBBFAC,,, | Performed by: INTERNAL MEDICINE

## 2020-06-11 PROCEDURE — 1101F PT FALLS ASSESS-DOCD LE1/YR: CPT | Mod: CPTII,S$GLB,, | Performed by: INTERNAL MEDICINE

## 2020-06-11 PROCEDURE — 1159F PR MEDICATION LIST DOCUMENTED IN MEDICAL RECORD: ICD-10-PCS | Mod: S$GLB,,, | Performed by: INTERNAL MEDICINE

## 2020-06-11 PROCEDURE — 99214 OFFICE O/P EST MOD 30 MIN: CPT | Mod: S$GLB,,, | Performed by: INTERNAL MEDICINE

## 2020-06-11 PROCEDURE — 1101F PR PT FALLS ASSESS DOC 0-1 FALLS W/OUT INJ PAST YR: ICD-10-PCS | Mod: CPTII,S$GLB,, | Performed by: INTERNAL MEDICINE

## 2020-06-11 NOTE — PROGRESS NOTES
[facility] Note    Chief Complaint      Chief Complaint   Patient presents with    Shortness of Breath    Hallucinations     History of Present Illness      Daria Carter is a 89 y.o. female who presents today for swallowing difficulty, hallucinations.  Patient comes to appointment with daughter.    Having difficulty laying to sleep at night, laying on her back. Periodic SOB, worsening over the last few months. Has been coughing after eating.  Has issues with swallowing, feels like it gets stuck in lower esophagus.  Also has trouble swallowing liquids.  Happens with every meals.  Belches after eating, then regurgitates food.  Belches some throughout the course of the day.  No abd pain.  Bowels move every 3-4 days, takes laxative on occasion.    Per daughter, has been having issues with confusion.  Has been hearing things in the house, thinks there are ghosts in the house.  Thinks that the doors are closing on their own, pictures moving on the wall.  Daughter says happens all day long, sometimes worse in AM.  Takes Lasix 40 mg daily, weight has gone up some.  Problem List Items Addressed This Visit     None      Visit Diagnoses     Confusion    -  Primary    Relevant Orders    Urinalysis, Reflex to Urine Culture Urine, Clean Catch    Vitamin B12    TSH    Comprehensive metabolic panel    CBC auto differential    CT Head Without Contrast    Dysphagia, unspecified type        Relevant Orders    Fl Modified Barium Swallow Speech    Shortness of breath        Relevant Orders    X-Ray Chest PA And Lateral    Brain Natriuretic Peptide    Localized edema        Relevant Orders    Brain Natriuretic Peptide    Dementia with behavioral disturbance, unspecified dementia type         Relevant Orders    Vitamin B12    Ambulatory referral/consult to Home Health    Esophageal obstruction         Relevant Orders    Fl Modified Barium Swallow Speech        Health Maintenance   Topic Date Due    TETANUS  VACCINE  01/16/2021 (Originally 11/11/1947)    Pneumococcal Vaccine (65+ Low/Medium Risk) (1 of 2 - PCV13) 01/16/2021 (Originally 11/11/1994)    Lipid Panel  01/16/2025       Past Medical History:   Diagnosis Date    Diabetes mellitus     Hypertension        Past Surgical History:   Procedure Laterality Date    CATARACT EXTRACTION EXTRACAPSULAR W/ INTRAOCULAR LENS IMPLANTATION Bilateral        family history includes COPD in her father; Cancer in her mother; Heart disease in her mother; Stroke in her father.    Social History     Tobacco Use    Smoking status: Never Smoker    Smokeless tobacco: Never Used   Substance Use Topics    Alcohol use: No    Drug use: No       Review of Systems   Constitutional: Negative for chills and fever.   HENT: Negative for congestion and sore throat.    Eyes: Negative for blurred vision and discharge.   Respiratory: Positive for shortness of breath. Negative for cough.    Cardiovascular: Positive for orthopnea. Negative for chest pain and palpitations.   Gastrointestinal: Negative for constipation, diarrhea, nausea and vomiting.   Genitourinary: Negative for dysuria and hematuria.   Musculoskeletal: Negative for falls and myalgias.   Skin: Negative for itching and rash.   Neurological: Negative for dizziness and headaches.   Psychiatric/Behavioral: Positive for hallucinations.        Outpatient Encounter Medications as of 6/11/2020   Medication Sig Dispense Refill    aspirin (ECOTRIN) 81 MG EC tablet Take 81 mg by mouth once daily.      carvedilol (COREG) 25 MG tablet Take 1 tablet (25 mg total) by mouth 2 (two) times daily. 180 tablet 3    furosemide (LASIX) 40 MG tablet Take 1 tablet (40 mg total) by mouth daily as needed (for swelling or weight gain of 5 lbs.). 90 tablet 3    losartan (COZAAR) 25 MG tablet Take 1 tablet (25 mg total) by mouth with lunch. 90 tablet 3    pantoprazole (PROTONIX) 40 MG tablet Take 1 tablet (40 mg total) by mouth once daily. 30 tablet 11      No facility-administered encounter medications on file as of 6/11/2020.         Review of patient's allergies indicates:  No Known Allergies    Physical Exam      Vital Signs  Temp: 96.9 °F (36.1 °C)  Temp src: Oral  Pulse: 76  SpO2: (!) 89 %  BP: (!) 140/86  BP Location: Left arm  Patient Position: Sitting  Pain Score: 0-No pain]    Physical Exam   Constitutional: She is oriented to person, place, and time. She appears well-developed and well-nourished.   HENT:   Head: Normocephalic and atraumatic.   Right Ear: External ear normal.   Left Ear: External ear normal.   Eyes: Right eye exhibits no discharge. Left eye exhibits no discharge.   Neck: Normal range of motion. No thyromegaly present.   Cardiovascular: Normal rate, regular rhythm, normal heart sounds and intact distal pulses.   No murmur heard.  Pulmonary/Chest: Effort normal. No respiratory distress.   Bibasilar crackles   Abdominal: Soft. Bowel sounds are normal. She exhibits no distension. There is no tenderness.   Musculoskeletal: Normal range of motion. She exhibits edema. She exhibits no deformity.   Neurological: She is alert and oriented to person, place, and time.   Skin: Skin is warm and dry. No rash noted.   Psychiatric: She has a normal mood and affect. Her behavior is normal.        Laboratory:  CBC:  No results for input(s): WBC, RBC, HGB, HCT, PLT, MCV, MCH, MCHC in the last 2160 hours.  CMP:  No results for input(s): GLU, CALCIUM, ALBUMIN, PROT, NA, K, CO2, CL, BUN, ALKPHOS, ALT, AST, BILITOT in the last 2160 hours.    Invalid input(s): CREATININ  URINALYSIS:  No results for input(s): COLORU, CLARITYU, SPECGRAV, PHUR, PROTEINUA, GLUCOSEU, BILIRUBINCON, BLOODU, WBCU, RBCU, BACTERIA, MUCUS, NITRITE, LEUKOCYTESUR, UROBILINOGEN, HYALINECASTS in the last 2160 hours.   LIPIDS:  No results for input(s): TSH, HDL, CHOL, TRIG, LDLCALC, CHOLHDL, NONHDLCHOL, TOTALCHOLEST in the last 2160 hours.  TSH:  No results for input(s): TSH in the last 2160  hours.  A1C:  No results for input(s): HGBA1C in the last 2160 hours.    Radiology:  No new imaging on file    Assessment/Plan     Daria Carter is a 89 y.o.female with:    1. Confusion  - Urinalysis, Reflex to Urine Culture Urine, Clean Catch; Future  - Vitamin B12; Future  - TSH; Future  - Comprehensive metabolic panel; Future  - CBC auto differential; Future  - CT Head Without Contrast; Future    2. Dysphagia, unspecified type  - Fl Modified Barium Swallow Speech; Future    3. Shortness of breath  - X-Ray Chest PA And Lateral; Future  - Brain Natriuretic Peptide; Future    4. Localized edema  - Brain Natriuretic Peptide; Future    5. Dementia with behavioral disturbance, unspecified dementia type   - Vitamin B12; Future  - Ambulatory referral/consult to Home Health; Future    6. Esophageal obstruction   - Fl Modified Barium Swallow Speech; Future    -Labs, CT head given AMS downstairs today.  Concerned for CHF given BLE edema and PND, will check BNP and CXR  - PT/Speech/aide/nurse ordered  -Continue current medications and maintain follow up with specialists.  Return to clinic in 4 weeks    Vanessa Atkins MD  [facility]

## 2020-06-11 NOTE — TELEPHONE ENCOUNTER
----- Message from Georgette Juarez sent at 6/11/2020  1:29 PM CDT -----  Contact:  [facility]  called back regarding  Pt  home health referral  Pt will be admitted on 6/13/20  can be reached at  [phone]

## 2020-06-12 ENCOUNTER — TELEPHONE (OUTPATIENT)
Dept: FAMILY MEDICINE | Facility: CLINIC | Age: 85
End: 2020-06-12

## 2020-06-12 NOTE — TELEPHONE ENCOUNTER
Spoke with patient and patient's daughter Laura. Informed patient of recent CXR and CT results, and Dr Atkins's recommendation to increase lasix to 2 daily for 5 days. Pt verbalizes understanding and states that they will call the office next week with update.

## 2020-06-12 NOTE — TELEPHONE ENCOUNTER
----- Message from Vanessa Atkins MD sent at 6/11/2020  1:12 PM CDT -----  CXR shows fluid as suspected.  Would like her to take 2 lasix daily for the next 5 days.  Contact me next week by phone and let me know how she is doing.    CT head shows nothing acute, does have some chronic changes expected with aging.

## 2020-06-13 PROCEDURE — G0180 PR HOME HEALTH MD CERTIFICATION: ICD-10-PCS | Mod: ,,, | Performed by: INTERNAL MEDICINE

## 2020-06-13 PROCEDURE — G0180 MD CERTIFICATION HHA PATIENT: HCPCS | Mod: ,,, | Performed by: INTERNAL MEDICINE

## 2020-06-18 ENCOUNTER — CLINICAL SUPPORT (OUTPATIENT)
Dept: SPEECH THERAPY | Facility: HOSPITAL | Age: 85
End: 2020-06-18
Attending: INTERNAL MEDICINE
Payer: MEDICARE

## 2020-06-18 ENCOUNTER — HOSPITAL ENCOUNTER (OUTPATIENT)
Dept: RADIOLOGY | Facility: HOSPITAL | Age: 85
Discharge: HOME | End: 2020-06-18
Attending: INTERNAL MEDICINE
Payer: MEDICARE

## 2020-06-18 ENCOUNTER — TELEPHONE (OUTPATIENT)
Dept: FAMILY MEDICINE | Facility: CLINIC | Age: 85
End: 2020-06-18

## 2020-06-18 DIAGNOSIS — R13.10 DYSPHAGIA, UNSPECIFIED TYPE: ICD-10-CM

## 2020-06-18 DIAGNOSIS — R13.12 OROPHARYNGEAL DYSPHAGIA: Primary | ICD-10-CM

## 2020-06-18 DIAGNOSIS — K22.2 ESOPHAGEAL OBSTRUCTION: ICD-10-CM

## 2020-06-18 PROCEDURE — 92611 MOTION FLUOROSCOPY/SWALLOW: CPT

## 2020-06-18 PROCEDURE — 74230 X-RAY XM SWLNG FUNCJ C+: CPT | Mod: 26,,, | Performed by: RADIOLOGY

## 2020-06-18 PROCEDURE — 25500020 PHARM REV CODE 255: Performed by: INTERNAL MEDICINE

## 2020-06-18 PROCEDURE — A9698 NON-RAD CONTRAST MATERIALNOC: HCPCS | Performed by: INTERNAL MEDICINE

## 2020-06-18 PROCEDURE — 74230 X-RAY XM SWLNG FUNCJ C+: CPT | Mod: TC

## 2020-06-18 PROCEDURE — 74230 FL MODIFIED BARIUM SWALLOW SPEECH STUDY: ICD-10-PCS | Mod: 26,,, | Performed by: RADIOLOGY

## 2020-06-18 PROCEDURE — 97535 SELF CARE MNGMENT TRAINING: CPT

## 2020-06-18 RX ADMIN — BARIUM SULFATE 55 ML: 0.81 POWDER, FOR SUSPENSION ORAL at 10:06

## 2020-06-18 NOTE — TELEPHONE ENCOUNTER
Called patient daughter and informed of swallow study results and that Dr. Atkins order for a speech therapist to come to patient home to help with swallowing.

## 2020-06-18 NOTE — PROGRESS NOTES
Please let patient's daughter know I reviewed her swallow studies and the speech pathologist has recommended she have a soft diet and swallow twice after each bite to help food go down.    I'm going to order a speech therapist to come work with her at home to see if we can improve her swallowing process.

## 2020-06-18 NOTE — PROGRESS NOTES
REHAB SERVICE VIDEO SWALLOW STUDY    Name: Daria Carter  YOB: 1929  MRN:  [id number]  Referring Provider: Vanessa Atkins MD  [id number] [facility]  [address]  [place] [address]  Onset Date:  6/11/2020  SOC Date:  6/18/2020  Certification Period:  6/18/2020 to 7/17/2020  Primary Diagnosis:  Dysphagia, unspecified type  Treatment Diagnosis:  Dysphagia, unspecified type     Prior Therapy Dates/Results (same condition):  None per EMR and pt report.    Functional Deficits Leading to Referral: Patient was referred for a Modified Barium Swallow Study to assess the efficiency of his/her swallow function, rule out aspiration and make recommendations regarding safe dietary consistencies, effective compensatory strategies, and safe eating environment.     Pertinent Medical History:    Past Medical History:   Diagnosis Date    Diabetes mellitus     Hypertension      Past Surgical History:   Procedure Laterality Date    CATARACT EXTRACTION EXTRACAPSULAR W/ INTRAOCULAR LENS IMPLANTATION Bilateral        Prior Level of Function:  Requires min-mod A with ADLs. Lives with daughter. Daughter accompanied pt to appt today.  Social Cultural:  All social/cultural beliefs were taken into consideration for POC.   Nutrition:  Pt appears adequately nourished.   Signs of Abuse:  No  Medication:    Current Outpatient Medications on File Prior to Visit   Medication Sig Dispense Refill    aspirin (ECOTRIN) 81 MG EC tablet Take 81 mg by mouth once daily.      carvedilol (COREG) 25 MG tablet Take 1 tablet (25 mg total) by mouth 2 (two) times daily. 180 tablet 3    furosemide (LASIX) 40 MG tablet Take 1 tablet (40 mg total) by mouth daily as needed (for swelling or weight gain of 5 lbs.). 90 tablet 3    losartan (COZAAR) 25 MG tablet Take 1 tablet (25 mg total) by mouth with lunch. 90 tablet 3    pantoprazole (PROTONIX) 40 MG tablet Take 1 tablet (40 mg total) by mouth once daily. 30 tablet 11     Current Facility-Administered  Medications on File Prior to Visit   Medication Dose Route Frequency Provider Last Rate Last Dose    [COMPLETED] barium sulfate (VARIBAR PUDDING) oral paste 10 mL  10 mL Oral ONCE PRN Vanessa Atkins MD   10 mL at 06/18/20 1058    [COMPLETED] barium sulfate (VARIBAR THIN LIQUID) oral powder 55 mL  55 mL Oral ONCE PRN Vanessa Atkins MD   55 mL at 06/18/20 1057       Alertness/Attention:  Pt awake/alert and follows most commands.    Oral Motor:    Oral Musculature: Generalized weakness  Structure Abnormalities: None  Dentition: Edentulous; has dentures though daughter reports inconsistent use with eating  Secretion Management: Adequate  Mucosal Quality: Good  Mandibular Strength and Mobility: Moderately reduced  Oral Labial Strength and Mobility: Mildly reduced  Lingual Strength and Mobility: Moderately reduced  Velar Elevation: WFL  Buccal Strength and Mobility: Mildly reduced  Volitional Cough: Did not elicit  Volitional Swallow: Delayed  Voice Prior to PO Intake: Clear    Patient Position:  Sitting  View:  Lateral  Presentations:    THIN:- 5cc, 10cc, 20cc cup sips thin liquid barium; self regulated straw sips thin liquid barium  PUREE:- tsp bites of pudding with barium paste x1  DENTAL SOFT:- tsp bites of diced peaches coated in barium powder x1  SOLID:- 1 inch bite of xenia cracker with barium paste x1      Oral Preparation / Oral Phase   Pt presents with moderate oral dysphagia c/b:   Reduced bolus formation and control    Reduced oral awareness   prolonged A-P transfer, prolonged mastication    Oral residue present post swallow: most prominent with hard solid   No presence of naso-pharyngeal reflux    Pharyngeal Phase   Pt presents with mild-moderate pharyngeal dysphagia c/b:   Pharyngeal swallow delay with swallow triggered at the level of the pyriform sinuses; presbyphagia    Premature spillage with pooling of liquids observed in the pharynx    Pt with reduced BOT retraction   Pt with reduced  hyolaryngeal elevation and excursion patterns   Pt with mildly incomplete epiglottic inversion patterns   Flash penetration observed during the swallow for thin liquids and puree textures. Penetrated material did not reach true vocal cords and was quickly ejected into the pharynx.    Pt without Aspiration before/during/ or after the swallow across ALL textures   Multiple spontaneous swallows needed per bite/sip; most needed with hard solid; verbal cues needed 50% of the time to produce dry swallow   Mild-moderate vallecular and mild pyriform sinus residue present across all consistencies trialed    Reduced posterior pharyngeal wall constriction    Per Rosenbeck's 8-Point Penetration- Aspiration Scale: (2) Material enters the airway, remains above the vocal folds, and is ejected from the airway.    Cervical Esophageal Phase    UES appeared to accommodate all bolus types without stasis or retrograde movement observed     Strategies/Compensatory Techniques Utilized:    1. Multiple swallows  2. Alternating liquids/solids    Impressions:  Pt presents with moderate oral and mild-moderate pharyngeal dysphagia c/b reduced bolus control/awareness, prolonged munching, delayed A-P transfer, oral residue s/p swallow, premature spillage into the pharynx, delayed swallow initiation, reduced HLE, and incomplete epiglottic inversion resulting in flash penetration of thin liquids and puree textures. She is deemed safe to continue on an oral diet given standard aspiration precautions and while avoiding certain food/textures. Rec'd Dental Soft/Thin liquids with double swallow.    Recommendations/Precautions:    1. Dental Soft/Thin liquid diet  2. Assistance with meals as needed  3. Small bites/sips & Alternate bites/sips  4. Double swallow to clear oropharyngeal residue  5. Monitor for any overt s/s of aspiration (ie coughing/choking, throat clearing, nasal emission, watery eyes, spike in fever, drop in O2 sats, wet/gurgly  voice, etc.)    Education: Education provided to pt and dgtr re: role of SLP, POC, MBSS procedure, results of MBSS, normal anatomy and physiology of the swallow vs geriatric swallow, swallow precautions, and aspiration risks. SLP reviewed diet recommendation and foods to avoid. Dgtr, who is pt's caregiver, verbalized complete understanding of taught material. Time allowed for questions which were all answered within SLP scope.    Plan:    1. SLP to send MBSS report to referring provider.  2. No further SLP tx needed at this time. Please re-consult should change in status occur.      TIME RECORD  Date: 6/18/2020  Start Time:  10:35 AM  Stop Time:  10:57 AM    PROCEDURES:  Total Timed Minutes:  15  Total Timed Units:  1  Total Untimed Units:  1  Charges Billed/# of units:  2    ANY Conklin, CCC-SLP  Speech-Language Pathologist

## 2020-06-19 ENCOUNTER — EXTERNAL HOME HEALTH (OUTPATIENT)
Dept: HOME HEALTH SERVICES | Facility: HOSPITAL | Age: 85
End: 2020-06-19
Payer: MEDICARE

## 2020-06-23 ENCOUNTER — DOCUMENT SCAN (OUTPATIENT)
Dept: HOME HEALTH SERVICES | Facility: HOSPITAL | Age: 85
End: 2020-06-23
Payer: MEDICARE

## 2020-07-13 ENCOUNTER — HOSPITAL ENCOUNTER (INPATIENT)
Facility: HOSPITAL | Age: 85
LOS: 2 days | Discharge: HOME HEALTH | DRG: 292 | End: 2020-07-15
Attending: EMERGENCY MEDICINE | Admitting: FAMILY MEDICINE
Payer: MEDICARE

## 2020-07-13 ENCOUNTER — TELEPHONE (OUTPATIENT)
Dept: FAMILY MEDICINE | Facility: CLINIC | Age: 85
End: 2020-07-13

## 2020-07-13 ENCOUNTER — NURSE TRIAGE (OUTPATIENT)
Dept: ADMINISTRATIVE | Facility: CLINIC | Age: 85
End: 2020-07-13

## 2020-07-13 DIAGNOSIS — I50.9 CHF (CONGESTIVE HEART FAILURE): ICD-10-CM

## 2020-07-13 DIAGNOSIS — R06.02 SHORTNESS OF BREATH: ICD-10-CM

## 2020-07-13 DIAGNOSIS — I50.9 ACUTE ON CHRONIC CONGESTIVE HEART FAILURE: Primary | ICD-10-CM

## 2020-07-13 DIAGNOSIS — I50.9 HEART FAILURE: ICD-10-CM

## 2020-07-13 DIAGNOSIS — I10 ESSENTIAL HYPERTENSION: ICD-10-CM

## 2020-07-13 LAB
ALBUMIN SERPL BCP-MCNC: 3.2 G/DL (ref 3.5–5.2)
ALP SERPL-CCNC: 43 U/L (ref 55–135)
ALT SERPL W/O P-5'-P-CCNC: 9 U/L (ref 10–44)
ANION GAP SERPL CALC-SCNC: 10 MMOL/L (ref 8–16)
AST SERPL-CCNC: 16 U/L (ref 10–40)
BASOPHILS # BLD AUTO: 0.01 K/UL (ref 0–0.2)
BASOPHILS NFR BLD: 0.3 % (ref 0–1.9)
BILIRUB SERPL-MCNC: 0.8 MG/DL (ref 0.1–1)
BILIRUB UR QL STRIP: NEGATIVE
BNP SERPL-MCNC: 2423 PG/ML (ref 0–99)
BUN SERPL-MCNC: 34 MG/DL (ref 8–23)
CALCIUM SERPL-MCNC: 9.1 MG/DL (ref 8.7–10.5)
CHLORIDE SERPL-SCNC: 102 MMOL/L (ref 95–110)
CLARITY UR: CLEAR
CO2 SERPL-SCNC: 30 MMOL/L (ref 23–29)
COLOR UR: YELLOW
CREAT SERPL-MCNC: 2.2 MG/DL (ref 0.5–1.4)
DIFFERENTIAL METHOD: ABNORMAL
EOSINOPHIL # BLD AUTO: 0 K/UL (ref 0–0.5)
EOSINOPHIL NFR BLD: 0.6 % (ref 0–8)
ERYTHROCYTE [DISTWIDTH] IN BLOOD BY AUTOMATED COUNT: 14.3 % (ref 11.5–14.5)
EST. GFR  (AFRICAN AMERICAN): 22 ML/MIN/1.73 M^2
EST. GFR  (NON AFRICAN AMERICAN): 19 ML/MIN/1.73 M^2
ESTIMATED AVG GLUCOSE: 126 MG/DL (ref 68–131)
GIANT PLATELETS BLD QL SMEAR: PRESENT
GLUCOSE SERPL-MCNC: 146 MG/DL (ref 70–110)
GLUCOSE UR QL STRIP: NEGATIVE
HBA1C MFR BLD HPLC: 6 % (ref 4–5.6)
HCT VFR BLD AUTO: 35 % (ref 37–48.5)
HGB BLD-MCNC: 11.1 G/DL (ref 12–16)
HGB UR QL STRIP: NEGATIVE
IMM GRANULOCYTES # BLD AUTO: 0.01 K/UL (ref 0–0.04)
IMM GRANULOCYTES NFR BLD AUTO: 0.3 % (ref 0–0.5)
KETONES UR QL STRIP: NEGATIVE
LEUKOCYTE ESTERASE UR QL STRIP: NEGATIVE
LYMPHOCYTES # BLD AUTO: 0.6 K/UL (ref 1–4.8)
LYMPHOCYTES NFR BLD: 17.6 % (ref 18–48)
MAGNESIUM SERPL-MCNC: 1.8 MG/DL (ref 1.6–2.6)
MCH RBC QN AUTO: 32.1 PG (ref 27–31)
MCHC RBC AUTO-ENTMCNC: 31.7 G/DL (ref 32–36)
MCV RBC AUTO: 101 FL (ref 82–98)
MONOCYTES # BLD AUTO: 0.4 K/UL (ref 0.3–1)
MONOCYTES NFR BLD: 10.7 % (ref 4–15)
NEUTROPHILS # BLD AUTO: 2.4 K/UL (ref 1.8–7.7)
NEUTROPHILS NFR BLD: 70.5 % (ref 38–73)
NITRITE UR QL STRIP: NEGATIVE
NRBC BLD-RTO: 0 /100 WBC
PAPPENHEIMER BOD BLD QL SMEAR: PRESENT
PH UR STRIP: 6 [PH] (ref 5–8)
PHOSPHATE SERPL-MCNC: 4 MG/DL (ref 2.7–4.5)
PLATELET # BLD AUTO: 112 K/UL (ref 150–350)
PLATELET BLD QL SMEAR: ABNORMAL
PMV BLD AUTO: ABNORMAL FL (ref 9.2–12.9)
POLYCHROMASIA BLD QL SMEAR: ABNORMAL
POTASSIUM SERPL-SCNC: 3.6 MMOL/L (ref 3.5–5.1)
PROT SERPL-MCNC: 7.2 G/DL (ref 6–8.4)
PROT UR QL STRIP: NEGATIVE
RBC # BLD AUTO: 3.46 M/UL (ref 4–5.4)
SARS-COV-2 RDRP RESP QL NAA+PROBE: NEGATIVE
SODIUM SERPL-SCNC: 142 MMOL/L (ref 136–145)
SP GR UR STRIP: 1.01 (ref 1–1.03)
TROPONIN I SERPL DL<=0.01 NG/ML-MCNC: 0.03 NG/ML (ref 0–0.03)
URN SPEC COLLECT METH UR: NORMAL
UROBILINOGEN UR STRIP-ACNC: NEGATIVE EU/DL
WBC # BLD AUTO: 3.36 K/UL (ref 3.9–12.7)

## 2020-07-13 PROCEDURE — 85025 COMPLETE CBC W/AUTO DIFF WBC: CPT

## 2020-07-13 PROCEDURE — 96374 THER/PROPH/DIAG INJ IV PUSH: CPT

## 2020-07-13 PROCEDURE — 81003 URINALYSIS AUTO W/O SCOPE: CPT

## 2020-07-13 PROCEDURE — U0002 COVID-19 LAB TEST NON-CDC: HCPCS

## 2020-07-13 PROCEDURE — 96375 TX/PRO/DX INJ NEW DRUG ADDON: CPT | Performed by: EMERGENCY MEDICINE

## 2020-07-13 PROCEDURE — 84484 ASSAY OF TROPONIN QUANT: CPT

## 2020-07-13 PROCEDURE — P9612 CATHETERIZE FOR URINE SPEC: HCPCS

## 2020-07-13 PROCEDURE — 80053 COMPREHEN METABOLIC PANEL: CPT

## 2020-07-13 PROCEDURE — 83036 HEMOGLOBIN GLYCOSYLATED A1C: CPT

## 2020-07-13 PROCEDURE — 93010 EKG 12-LEAD: ICD-10-PCS | Mod: ,,, | Performed by: INTERNAL MEDICINE

## 2020-07-13 PROCEDURE — 63600175 PHARM REV CODE 636 W HCPCS: Performed by: NURSE PRACTITIONER

## 2020-07-13 PROCEDURE — 12000002 HC ACUTE/MED SURGE SEMI-PRIVATE ROOM

## 2020-07-13 PROCEDURE — 93010 ELECTROCARDIOGRAM REPORT: CPT | Mod: ,,, | Performed by: INTERNAL MEDICINE

## 2020-07-13 PROCEDURE — 96376 TX/PRO/DX INJ SAME DRUG ADON: CPT | Performed by: EMERGENCY MEDICINE

## 2020-07-13 PROCEDURE — G0378 HOSPITAL OBSERVATION PER HR: HCPCS

## 2020-07-13 PROCEDURE — 83735 ASSAY OF MAGNESIUM: CPT

## 2020-07-13 PROCEDURE — 83880 ASSAY OF NATRIURETIC PEPTIDE: CPT

## 2020-07-13 PROCEDURE — 63600175 PHARM REV CODE 636 W HCPCS: Performed by: EMERGENCY MEDICINE

## 2020-07-13 PROCEDURE — 93005 ELECTROCARDIOGRAM TRACING: CPT

## 2020-07-13 PROCEDURE — 99285 EMERGENCY DEPT VISIT HI MDM: CPT | Mod: 25

## 2020-07-13 PROCEDURE — 84100 ASSAY OF PHOSPHORUS: CPT

## 2020-07-13 RX ORDER — POLYETHYLENE GLYCOL 3350 17 G/17G
17 POWDER, FOR SOLUTION ORAL DAILY
Status: DISCONTINUED | OUTPATIENT
Start: 2020-07-14 | End: 2020-07-15 | Stop reason: HOSPADM

## 2020-07-13 RX ORDER — IBUPROFEN 100 MG/5ML
1000 SUSPENSION, ORAL (FINAL DOSE FORM) ORAL DAILY
COMMUNITY

## 2020-07-13 RX ORDER — HYDRALAZINE HYDROCHLORIDE 20 MG/ML
5 INJECTION INTRAMUSCULAR; INTRAVENOUS EVERY 8 HOURS PRN
Status: DISCONTINUED | OUTPATIENT
Start: 2020-07-13 | End: 2020-07-15 | Stop reason: HOSPADM

## 2020-07-13 RX ORDER — PANTOPRAZOLE SODIUM 40 MG/1
40 TABLET, DELAYED RELEASE ORAL DAILY
Status: DISCONTINUED | OUTPATIENT
Start: 2020-07-14 | End: 2020-07-15 | Stop reason: HOSPADM

## 2020-07-13 RX ORDER — FUROSEMIDE 10 MG/ML
80 INJECTION INTRAMUSCULAR; INTRAVENOUS
Status: COMPLETED | OUTPATIENT
Start: 2020-07-13 | End: 2020-07-13

## 2020-07-13 RX ORDER — SODIUM CHLORIDE 0.9 % (FLUSH) 0.9 %
10 SYRINGE (ML) INJECTION
Status: DISCONTINUED | OUTPATIENT
Start: 2020-07-13 | End: 2020-07-15 | Stop reason: HOSPADM

## 2020-07-13 RX ORDER — ONDANSETRON 4 MG/1
4 TABLET, ORALLY DISINTEGRATING ORAL EVERY 8 HOURS PRN
Status: DISCONTINUED | OUTPATIENT
Start: 2020-07-13 | End: 2020-07-15 | Stop reason: HOSPADM

## 2020-07-13 RX ORDER — ACETAMINOPHEN 325 MG/1
650 TABLET ORAL EVERY 6 HOURS PRN
Status: DISCONTINUED | OUTPATIENT
Start: 2020-07-13 | End: 2020-07-15 | Stop reason: HOSPADM

## 2020-07-13 RX ORDER — ASPIRIN 81 MG/1
81 TABLET ORAL DAILY
Status: DISCONTINUED | OUTPATIENT
Start: 2020-07-14 | End: 2020-07-15 | Stop reason: HOSPADM

## 2020-07-13 RX ORDER — HYDRALAZINE HYDROCHLORIDE 20 MG/ML
5 INJECTION INTRAMUSCULAR; INTRAVENOUS ONCE
Status: COMPLETED | OUTPATIENT
Start: 2020-07-13 | End: 2020-07-13

## 2020-07-13 RX ORDER — HYDRALAZINE HYDROCHLORIDE 20 MG/ML
5 INJECTION INTRAMUSCULAR; INTRAVENOUS EVERY 8 HOURS PRN
Status: DISCONTINUED | OUTPATIENT
Start: 2020-07-13 | End: 2020-07-13

## 2020-07-13 RX ADMIN — HYDRALAZINE HYDROCHLORIDE 5 MG: 20 INJECTION INTRAMUSCULAR; INTRAVENOUS at 10:07

## 2020-07-13 RX ADMIN — FUROSEMIDE 80 MG: 10 INJECTION, SOLUTION INTRAVENOUS at 09:07

## 2020-07-13 NOTE — TELEPHONE ENCOUNTER
Daughter states pt provider advised her that pt should go to ER immediately, she states pt really does not want to go in alone, advised her that she needs to get her to ER as her health is the main concern at this point, she states pt is adamant about not going, advised her if pt is confused and seeing people that are not there she may not be in her right mind enough to make these decisions and her family needs to make them for her, she states she will see what they will do     Reason for Disposition   Nursing judgment    Protocols used: ST NO GUIDELINE OR REFERENCE [id number]

## 2020-07-14 PROBLEM — I48.91 ATRIAL FIBRILLATION: Status: ACTIVE | Noted: 2020-07-14

## 2020-07-14 PROBLEM — E11.9 TYPE 2 DIABETES MELLITUS: Status: ACTIVE | Noted: 2020-07-14

## 2020-07-14 PROBLEM — I50.9 HEART FAILURE: Status: ACTIVE | Noted: 2020-07-14

## 2020-07-14 LAB
ANION GAP SERPL CALC-SCNC: 12 MMOL/L (ref 8–16)
AORTIC ROOT ANNULUS: 2.85 CM
AORTIC VALVE CUSP SEPERATION: 1.79 CM
AV INDEX (PROSTH): 0.54
AV MEAN GRADIENT: 4 MMHG
AV PEAK GRADIENT: 9 MMHG
AV REGURGITATION PRESSURE HALF TIME: 650 MS
AV VALVE AREA: 1.79 CM2
AV VELOCITY RATIO: 0.55
BASOPHILS # BLD AUTO: 0.01 K/UL (ref 0–0.2)
BASOPHILS NFR BLD: 0.3 % (ref 0–1.9)
BSA FOR ECHO PROCEDURE: 1.64 M2
BUN SERPL-MCNC: 32 MG/DL (ref 8–23)
CALCIUM SERPL-MCNC: 9.3 MG/DL (ref 8.7–10.5)
CHLORIDE SERPL-SCNC: 101 MMOL/L (ref 95–110)
CO2 SERPL-SCNC: 30 MMOL/L (ref 23–29)
CREAT SERPL-MCNC: 2 MG/DL (ref 0.5–1.4)
CV ECHO LV RWT: 0.54 CM
DIFFERENTIAL METHOD: ABNORMAL
DOP CALC AO PEAK VEL: 1.47 M/S
DOP CALC AO VTI: 33.3 CM
DOP CALC LVOT AREA: 3.3 CM2
DOP CALC LVOT DIAMETER: 2.06 CM
DOP CALC LVOT PEAK VEL: 0.81 M/S
DOP CALC LVOT STROKE VOLUME: 59.63 CM3
DOP CALCLVOT PEAK VEL VTI: 17.9 CM
E WAVE DECELERATION TIME: 327.09 MSEC
E/A RATIO: 0.53
E/E' RATIO: 18.33 M/S
ECHO LV POSTERIOR WALL: 1.33 CM (ref 0.6–1.1)
EOSINOPHIL # BLD AUTO: 0 K/UL (ref 0–0.5)
EOSINOPHIL NFR BLD: 0.8 % (ref 0–8)
ERYTHROCYTE [DISTWIDTH] IN BLOOD BY AUTOMATED COUNT: 14.3 % (ref 11.5–14.5)
EST. GFR  (AFRICAN AMERICAN): 25 ML/MIN/1.73 M^2
EST. GFR  (NON AFRICAN AMERICAN): 22 ML/MIN/1.73 M^2
FRACTIONAL SHORTENING: 24 % (ref 28–44)
GLUCOSE SERPL-MCNC: 123 MG/DL (ref 70–110)
HCT VFR BLD AUTO: 35.9 % (ref 37–48.5)
HGB BLD-MCNC: 11.4 G/DL (ref 12–16)
IMM GRANULOCYTES # BLD AUTO: 0.01 K/UL (ref 0–0.04)
IMM GRANULOCYTES NFR BLD AUTO: 0.3 % (ref 0–0.5)
INTERVENTRICULAR SEPTUM: 1.05 CM (ref 0.6–1.1)
IVRT: 125.59 MSEC
LA MAJOR: 5.56 CM
LA MINOR: 5.32 CM
LA WIDTH: 3.11 CM
LEFT ATRIUM SIZE: 4.25 CM
LEFT ATRIUM VOLUME INDEX: 36.8 ML/M2
LEFT ATRIUM VOLUME: 61.09 CM3
LEFT INTERNAL DIMENSION IN SYSTOLE: 3.71 CM (ref 2.1–4)
LEFT VENTRICLE DIASTOLIC VOLUME INDEX: 67.73 ML/M2
LEFT VENTRICLE DIASTOLIC VOLUME: 112.54 ML
LEFT VENTRICLE MASS INDEX: 134 G/M2
LEFT VENTRICLE SYSTOLIC VOLUME INDEX: 35.1 ML/M2
LEFT VENTRICLE SYSTOLIC VOLUME: 58.39 ML
LEFT VENTRICULAR INTERNAL DIMENSION IN DIASTOLE: 4.89 CM (ref 3.5–6)
LEFT VENTRICULAR MASS: 223 G
LV LATERAL E/E' RATIO: 18.33 M/S
LV SEPTAL E/E' RATIO: 18.33 M/S
LYMPHOCYTES # BLD AUTO: 0.5 K/UL (ref 1–4.8)
LYMPHOCYTES NFR BLD: 14.1 % (ref 18–48)
MAGNESIUM SERPL-MCNC: 1.7 MG/DL (ref 1.6–2.6)
MCH RBC QN AUTO: 31.7 PG (ref 27–31)
MCHC RBC AUTO-ENTMCNC: 31.8 G/DL (ref 32–36)
MCV RBC AUTO: 100 FL (ref 82–98)
MONOCYTES # BLD AUTO: 0.4 K/UL (ref 0.3–1)
MONOCYTES NFR BLD: 9.5 % (ref 4–15)
MV PEAK A VEL: 1.04 M/S
MV PEAK E VEL: 0.55 M/S
MV STENOSIS PRESSURE HALF TIME: 94.86 MS
MV VALVE AREA P 1/2 METHOD: 2.32 CM2
NEUTROPHILS # BLD AUTO: 2.8 K/UL (ref 1.8–7.7)
NEUTROPHILS NFR BLD: 75 % (ref 38–73)
NRBC BLD-RTO: 0 /100 WBC
PHOSPHATE SERPL-MCNC: 3.3 MG/DL (ref 2.7–4.5)
PISA MRMAX VEL: 0.07 M/S
PLATELET # BLD AUTO: 81 K/UL (ref 150–350)
PLATELET BLD QL SMEAR: ABNORMAL
PMV BLD AUTO: 13.4 FL (ref 9.2–12.9)
POTASSIUM SERPL-SCNC: 3.2 MMOL/L (ref 3.5–5.1)
PV PEAK VELOCITY: 0.51 CM/S
RA PRESSURE: 8 MMHG
RBC # BLD AUTO: 3.6 M/UL (ref 4–5.4)
RIGHT VENTRICULAR END-DIASTOLIC DIMENSION: 2.58 CM
SODIUM SERPL-SCNC: 143 MMOL/L (ref 136–145)
TDI LATERAL: 0.03 M/S
TDI SEPTAL: 0.03 M/S
TDI: 0.03 M/S
WBC # BLD AUTO: 3.77 K/UL (ref 3.9–12.7)

## 2020-07-14 PROCEDURE — 36415 COLL VENOUS BLD VENIPUNCTURE: CPT

## 2020-07-14 PROCEDURE — 94761 N-INVAS EAR/PLS OXIMETRY MLT: CPT

## 2020-07-14 PROCEDURE — 84100 ASSAY OF PHOSPHORUS: CPT

## 2020-07-14 PROCEDURE — 83735 ASSAY OF MAGNESIUM: CPT

## 2020-07-14 PROCEDURE — 99900035 HC TECH TIME PER 15 MIN (STAT)

## 2020-07-14 PROCEDURE — 63700000 PHARM REV CODE 250 ALT 637 W/O HCPCS: Performed by: FAMILY MEDICINE

## 2020-07-14 PROCEDURE — 11000001 HC ACUTE MED/SURG PRIVATE ROOM

## 2020-07-14 PROCEDURE — 80048 BASIC METABOLIC PNL TOTAL CA: CPT

## 2020-07-14 PROCEDURE — 96376 TX/PRO/DX INJ SAME DRUG ADON: CPT | Performed by: EMERGENCY MEDICINE

## 2020-07-14 PROCEDURE — 85025 COMPLETE CBC W/AUTO DIFF WBC: CPT

## 2020-07-14 PROCEDURE — 63600175 PHARM REV CODE 636 W HCPCS: Performed by: NURSE PRACTITIONER

## 2020-07-14 RX ORDER — FUROSEMIDE 10 MG/ML
40 INJECTION INTRAMUSCULAR; INTRAVENOUS
Status: DISCONTINUED | OUTPATIENT
Start: 2020-07-14 | End: 2020-07-15

## 2020-07-14 RX ORDER — POTASSIUM CHLORIDE 20 MEQ/15ML
20 SOLUTION ORAL ONCE
Status: DISCONTINUED | OUTPATIENT
Start: 2020-07-14 | End: 2020-07-15 | Stop reason: HOSPADM

## 2020-07-14 RX ADMIN — FUROSEMIDE 40 MG: 10 INJECTION, SOLUTION INTRAVENOUS at 09:07

## 2020-07-14 RX ADMIN — FUROSEMIDE 40 MG: 10 INJECTION, SOLUTION INTRAVENOUS at 08:07

## 2020-07-14 NOTE — PLAN OF CARE
Pt AAO*4. In bed watching TV. No signs of distress noted. Reviewed POC with pt. Verbalized understanding. Call bell in reach, bed lowest position, all safety precautions in place. WCTM.

## 2020-07-14 NOTE — ASSESSMENT & PLAN NOTE
Bun/Cr 34/2.2 on arrival, baseline Cr 1.3- cardiorenal   Strict intake and output   Avoid nephrotoxic meds, renal dose meds

## 2020-07-14 NOTE — PLAN OF CARE
Pt has been sustaining HR 80s to upper 90s. Notified Megan Smith NP. Ordered to continue to monitor and report HR >110.

## 2020-07-14 NOTE — ED PROVIDER NOTES
Encounter Date: 7/13/2020    SCRIBE #1 NOTE: I, Nancy Starr, am scribing for, and in the presence of,  Dr. Izquierdo. I have scribed the entire note.       History     Chief Complaint   Patient presents with    Shortness of Breath     c/o intermittent SOB and mild swelling to her legs for the past few days       Time seen by provider: 7:55 PM on 07/13/2020    The patient is a 89 y.o. female who presents to the ED with complaint of shortness of breath which onset gradually for a few days. Symptoms are moderate in severity. Patient denies any fever, chest pain, cough, and all other sxs at this time. No prior Tx included. Patient reports she becomes short of breath when she gets upset, and notes she feels better at this time. /89     has a past medical history of Diabetes mellitus and Hypertension.  has a past surgical history that includes Cataract extraction, extracapsular w/ intraocular lens implant (Bilateral).    The history is provided by the patient.     Review of patient's allergies indicates:  No Known Allergies  Past Medical History:   Diagnosis Date    Diabetes mellitus     Hypertension      Past Surgical History:   Procedure Laterality Date    CATARACT EXTRACTION EXTRACAPSULAR W/ INTRAOCULAR LENS IMPLANTATION Bilateral      Family History   Problem Relation Age of Onset    Cancer Mother     Heart disease Mother     Stroke Father     COPD Father      Social History     Tobacco Use    Smoking status: Never Smoker    Smokeless tobacco: Never Used   Substance Use Topics    Alcohol use: No    Drug use: No     Review of Systems   Constitutional: Negative for chills and fever.   HENT: Negative for ear pain and sore throat.    Eyes: Negative for redness.   Respiratory: Positive for shortness of breath. Negative for cough.    Cardiovascular: Negative for chest pain.   Gastrointestinal: Negative for abdominal pain, diarrhea and vomiting.   Genitourinary: Negative for dysuria.   Musculoskeletal:  Negative for back pain.   Skin: Negative for rash.   Neurological: Negative for headaches.       Physical Exam     Initial Vitals [07/13/20 1917]   BP Pulse Resp Temp SpO2   -- -- 20 97.4 °F (36.3 °C) --      MAP       --         Physical Exam    Nursing note and vitals reviewed.  Constitutional: She appears well-developed and well-nourished. She is not diaphoretic. No distress.   HENT:   Head: Normocephalic and atraumatic.   Eyes: Conjunctivae and EOM are normal.   Neck: Normal range of motion. Neck supple.   Cardiovascular: Normal rate and normal heart sounds. An irregularly irregular rhythm present.    Pulmonary/Chest: No respiratory distress.   Coarse crackles at bases bilaterally.   Abdominal: Soft. There is no abdominal tenderness.   Musculoskeletal: Normal range of motion. No tenderness or edema.      Comments: No LE edema   Neurological: She is alert and oriented to person, place, and time. She has normal strength.   Skin: Skin is warm and dry. Capillary refill takes less than 2 seconds.         ED Course   Procedures  Labs Reviewed   CBC W/ AUTO DIFFERENTIAL - Abnormal; Notable for the following components:       Result Value    WBC 3.36 (*)     RBC 3.46 (*)     Hemoglobin 11.1 (*)     Hematocrit 35.0 (*)     Mean Corpuscular Volume 101 (*)     Mean Corpuscular Hemoglobin 32.1 (*)     Mean Corpuscular Hemoglobin Conc 31.7 (*)     Platelets 112 (*)     Lymph # 0.6 (*)     Lymph% 17.6 (*)     Platelet Estimate Decreased (*)     All other components within normal limits   COMPREHENSIVE METABOLIC PANEL - Abnormal; Notable for the following components:    CO2 30 (*)     Glucose 146 (*)     BUN, Bld 34 (*)     Creatinine 2.2 (*)     Albumin 3.2 (*)     Alkaline Phosphatase 43 (*)     ALT 9 (*)     eGFR if  22 (*)     eGFR if non  19 (*)     All other components within normal limits   TROPONIN I - Abnormal; Notable for the following components:    Troponin I 0.028 (*)     All other  components within normal limits   B-TYPE NATRIURETIC PEPTIDE - Abnormal; Notable for the following components:    BNP 2,423 (*)     All other components within normal limits   HEMOGLOBIN A1C - Abnormal; Notable for the following components:    Hemoglobin A1C 6.0 (*)     All other components within normal limits   MAGNESIUM   PHOSPHORUS   SARS-COV-2 RNA AMPLIFICATION, QUAL   URINALYSIS, REFLEX TO URINE CULTURE    Narrative:     Specimen Source->Urine   MAGNESIUM   PHOSPHORUS   HEMOGLOBIN A1C     EKG Readings: (Independently Interpreted)   Rate 65; Atrial fibrillation; No ST elevations; Normal T waves       Imaging Results          X-Ray Chest AP Portable (Final result)  Result time 07/13/20 20:55:51    Final result by Reji Hough MD (07/13/20 20:55:51)                 Impression:      Cardiomegaly with worsening diffuse bilateral airspace opacities, suggestive of worsening CHF pattern of pulmonary edema.    Unchanged small bilateral pleural effusions with left greater than right.      Electronically signed by: Reji Hough MD  Date:    07/13/2020  Time:    20:55             Narrative:    EXAMINATION:  XR CHEST AP PORTABLE    CLINICAL HISTORY:  Shortness of breath    TECHNIQUE:  Single frontal view of the chest was performed.    COMPARISON:  06/11/2020.    FINDINGS:  Monitoring EKG leads are present.  The trachea is unremarkable.  There are calcifications of the aortic knob.  There is stable enlargement of the cardiomediastinal silhouette.  There are unchanged small bilateral pleural effusions with left greater than right.  There is no evidence of free air beneath the hemidiaphragms.  There is no evidence of a pneumothorax.  There is no evidence of pneumomediastinum.  There is worsening appearance of diffuse bilateral airspace opacities.  There are degenerative changes in the osseous structures.                                 Medical Decision Making:   History:   Old Medical Records: I decided to obtain old medical  records.  Independently Interpreted Test(s):   I have ordered and independently interpreted EKG Reading(s) - see prior notes  Clinical Tests:   Lab Tests: Ordered and Reviewed  Radiological Study: Ordered and Reviewed  Medical Tests: Ordered and Reviewed                   ED Course as of Jul 14 0043   Mon Jul 13, 2020 1916 Sort note: Daria Carter nontoxic/afebrile 89 y.o.  presented to the ED with c/o SOB.     Patient seen and medically screened by Physician assistant in Sort process due to ED crowding.  Appropriate tests and/or medications ordered.  Care transferred to an alternate provider when patient was placed in an Exam Room from the [facility] for physical exam, additional orders, and disposition. [facility]      [AM]   2125 Discussed with [facility] nurse practitioner who will admit.    [RT]      ED Course User Index  [AM] Karen Jimenez PA-C  [RT] Nancy Starr                Clinical Impression:       ICD-10-CM ICD-9-CM   1. Shortness of breath  R06.02 786.05   2. Acute on chronic congestive heart failure  I50.9 428.0             I, Dr. Vick Izquierdo, personally performed the services described in this documentation. All medical record entries made by the scribe were at my direction and in my presence. I have reviewed the chart and agree that the record reflects my personal performance and is accurate and complete. Vick Izquierdo MD.  12:43 AM 07/14/2020                  Vick Izquierdo MD  07/14/20 0043

## 2020-07-14 NOTE — SUBJECTIVE & OBJECTIVE
Past Medical History:   Diagnosis Date    Blindness of left eye     Dementia     Diabetes mellitus     Hypertension        Past Surgical History:   Procedure Laterality Date    CATARACT EXTRACTION EXTRACAPSULAR W/ INTRAOCULAR LENS IMPLANTATION Bilateral        Review of patient's allergies indicates:  No Known Allergies    No current facility-administered medications on file prior to encounter.      Current Outpatient Medications on File Prior to Encounter   Medication Sig    ascorbic acid, vitamin C, (VITAMIN C) 1000 MG tablet Take 1,000 mg by mouth once daily.    aspirin (ECOTRIN) 81 MG EC tablet Take 81 mg by mouth once daily.    carvedilol (COREG) 25 MG tablet Take 1 tablet (25 mg total) by mouth 2 (two) times daily.    furosemide (LASIX) 40 MG tablet Take 1 tablet (40 mg total) by mouth daily as needed (for swelling or weight gain of 5 lbs.).    losartan (COZAAR) 25 MG tablet Take 1 tablet (25 mg total) by mouth with lunch.    pantoprazole (PROTONIX) 40 MG tablet Take 1 tablet (40 mg total) by mouth once daily.     Family History     Problem Relation (Age of Onset)    COPD Father    Cancer Mother    Heart disease Mother    Stroke Father        Tobacco Use    Smoking status: Former Smoker    Smokeless tobacco: Never Used   Substance and Sexual Activity    Alcohol use: No    Drug use: No    Sexual activity: Not Currently     Review of Systems   Constitutional: Negative for chills, diaphoresis and fever.   Eyes: Negative for photophobia.   Respiratory: Positive for shortness of breath. Negative for cough, chest tightness and wheezing.    Cardiovascular: Negative for chest pain, palpitations and leg swelling.   Gastrointestinal: Negative for abdominal pain, diarrhea, nausea and vomiting.   Genitourinary: Negative for dysuria, flank pain, frequency and hematuria.   Musculoskeletal: Negative for back pain and myalgias.   Neurological: Negative for dizziness, syncope, light-headedness and headaches.    Psychiatric/Behavioral: Negative for confusion.     Objective:     Vital Signs (Most Recent):  Temp: 96.7 °F (35.9 °C) (07/14/20 0516)  Pulse: 77 (07/14/20 0516)  Resp: 16 (07/14/20 0516)  BP: (!) 179/84 (07/14/20 0516)  SpO2: (!) 82 % (07/14/20 0516) Vital Signs (24h Range):  Temp:  [96.7 °F (35.9 °C)-98 °F (36.7 °C)] 96.7 °F (35.9 °C)  Pulse:  [52-90] 77  Resp:  [12-28] 16  SpO2:  [82 %-100 %] 82 %  BP: (126-191)/() 179/84     Weight: 57.3 kg (126 lb 5.2 oz)  Body mass index is 19.79 kg/m².    Physical Exam  Constitutional:       Appearance: She is well-developed.   HENT:      Head: Normocephalic and atraumatic.   Eyes:      Conjunctiva/sclera: Conjunctivae normal.      Pupils: Pupils are equal, round, and reactive to light.   Neck:      Musculoskeletal: Normal range of motion.      Vascular: No JVD.   Cardiovascular:      Rate and Rhythm: Normal rate. Rhythm irregular.      Heart sounds: Normal heart sounds.   Pulmonary:      Effort: Pulmonary effort is normal. No respiratory distress.      Breath sounds: No wheezing.   Abdominal:      General: Bowel sounds are normal. There is no distension.      Palpations: Abdomen is soft.      Tenderness: There is no abdominal tenderness. There is no guarding.   Musculoskeletal: Normal range of motion.         General: No tenderness.   Skin:     General: Skin is warm and dry.      Capillary Refill: Capillary refill takes less than 2 seconds.   Neurological:      Mental Status: She is alert and oriented to person, place, and time.   Psychiatric:         Behavior: Behavior normal.           CRANIAL NERVES     CN III, IV, VI   Pupils are equal, round, and reactive to light.       Significant Labs:   BMP:   Recent Labs   Lab 07/13/20 2026   *      K 3.6      CO2 30*   BUN 34*   CREATININE 2.2*   CALCIUM 9.1   MG 1.8     CBC:   Recent Labs   Lab 07/13/20 2026   WBC 3.36*   HGB 11.1*   HCT 35.0*   *     Urine Studies:   Recent Labs   Lab  07/13/20  2237   COLORU Yellow   APPEARANCEUA Clear   PHUR 6.0   SPECGRAV 1.015   PROTEINUA Negative   GLUCUA Negative   KETONESU Negative   BILIRUBINUA Negative   OCCULTUA Negative   NITRITE Negative   UROBILINOGEN Negative   LEUKOCYTESUR Negative       Significant Imaging: I have reviewed all pertinent imaging results/findings within the past 24 hours.

## 2020-07-14 NOTE — HPI
Daria Carter is a 89 yo female with pmh of HTN, T2DM and dementia who presented to ED with complaint of shortness of breath and dyspnea on exertion. Onset 3-4 days ago gradually becoming worse. Denies chest pain, abdominal pain, n/v/d and fever/chills. Labs remarkable for BUN/Cr 34/2.2, BNP 2423, troponin 0.028. CXR shows cardiomegaly with worsening diffuse bilateral airspace opacities, suggestive of worsening CHF pattern of pulmonary edema. -190. EKG shows atrial fibrillation (? new onset). She received 80 mg furosemide. Patient admitted to [facility] hospital medicine.

## 2020-07-14 NOTE — SUBJECTIVE & OBJECTIVE
Interval History: awake and alert.   Replace K,continue with Lasix,   Awaits TTE      Review of Systems   Constitutional: Negative for chills, diaphoresis and fever.   Respiratory: Positive for shortness of breath. Negative for cough and wheezing.    Cardiovascular: Negative for chest pain, palpitations and leg swelling.   Gastrointestinal: Negative for abdominal pain, diarrhea, nausea and vomiting.   Genitourinary: Negative for frequency and hematuria.   Musculoskeletal: Negative for back pain and myalgias.   Neurological: Negative for syncope, light-headedness and headaches.   Psychiatric/Behavioral: Negative for confusion.     Objective:     Vital Signs (Most Recent):  Temp: 96.8 °F (36 °C) (07/14/20 0814)  Pulse: 76 (07/14/20 0814)  Resp: 18 (07/14/20 0814)  BP: (!) 156/87 (07/14/20 0814)  SpO2: (!) 94 % (07/14/20 1046) Vital Signs (24h Range):  Temp:  [96.7 °F (35.9 °C)-98 °F (36.7 °C)] 96.8 °F (36 °C)  Pulse:  [52-90] 76  Resp:  [12-28] 18  SpO2:  [82 %-100 %] 94 %  BP: (126-191)/() 156/87     Weight: 57.3 kg (126 lb 5.2 oz)  Body mass index is 19.79 kg/m².    Intake/Output Summary (Last 24 hours) at 7/14/2020 1051  Last data filed at 7/14/2020 0031  Gross per 24 hour   Intake --   Output 305 ml   Net -305 ml      Physical Exam  Constitutional:       Appearance: She is well-developed.   HENT:      Head: Normocephalic and atraumatic.   Neck:      Musculoskeletal: Normal range of motion.      Vascular: No JVD.   Cardiovascular:      Rate and Rhythm: Normal rate. Rhythm irregular.      Heart sounds: Normal heart sounds.   Pulmonary:      Effort: Pulmonary effort is normal. No respiratory distress.      Breath sounds: No wheezing.   Abdominal:      General: Bowel sounds are normal. There is no distension.      Palpations: Abdomen is soft.      Tenderness: There is no abdominal tenderness. There is no guarding.   Musculoskeletal: Normal range of motion.         General: No tenderness.   Skin:     General:  Skin is warm and dry.      Capillary Refill: Capillary refill takes less than 2 seconds.   Neurological:      Mental Status: She is alert and oriented to person, place, and time.   Psychiatric:         Behavior: Behavior normal.         Significant Labs:   A1C:   Recent Labs   Lab 07/13/20 2026   HGBA1C 6.0*     ABGs: No results for input(s): PH, PCO2, HCO3, POCSATURATED, BE, TOTALHB, COHB, METHB, O2HB, POCFIO2 in the last 48 hours.  CBC:   Recent Labs   Lab 07/13/20 2026 07/14/20  0539   WBC 3.36* 3.77*   HGB 11.1* 11.4*   HCT 35.0* 35.9*   * 81*     CMP:   Recent Labs   Lab 07/13/20 2026 07/14/20  0539    143   K 3.6 3.2*    101   CO2 30* 30*   * 123*   BUN 34* 32*   CREATININE 2.2* 2.0*   CALCIUM 9.1 9.3   PROT 7.2  --    ALBUMIN 3.2*  --    BILITOT 0.8  --    ALKPHOS 43*  --    AST 16  --    ALT 9*  --    ANIONGAP 10 12   EGFRNONAA 19* 22*     Cardiac Markers:   Recent Labs   Lab 07/13/20 2026   BNP 2,423*     Lactic Acid: No results for input(s): LACTATE in the last 48 hours.  Lipase: No results for input(s): LIPASE in the last 48 hours.  TSH:   Recent Labs   Lab 06/11/20  1141   TSH 1.760     Urine Culture: No results for input(s): LABURIN in the last 48 hours.  Urine Studies:   Recent Labs   Lab 07/13/20 2237   COLORU Yellow   APPEARANCEUA Clear   PHUR 6.0   SPECGRAV 1.015   PROTEINUA Negative   GLUCUA Negative   KETONESU Negative   BILIRUBINUA Negative   OCCULTUA Negative   NITRITE Negative   UROBILINOGEN Negative   LEUKOCYTESUR Negative       Significant Imaging: I have reviewed all pertinent imaging results/findings within the past 24 hours.

## 2020-07-14 NOTE — PLAN OF CARE
Plan of care reviewed with patient and daughter. Patient was unable to verbalize understanding of care. Daughter verbalized understanding of care. Patient is currently is currently on 2L nasal cannula. No signs/symptoms of distress noted. Telemetry applied. No true red alarms noted. Patient turned every two hours. Bed locked and low, call light within reach, bed alarm on, fall risk band applied. Will continue to monitor.

## 2020-07-14 NOTE — H&P
[facility]  History & Physical    Patient Name: Daria Carter  MRN: [id number]  Admission Date: 7/13/2020  Attending Physician: Bella Cote*   Primary Care Provider: Vanessa Atkins MD         Patient information was obtained from patient, past medical records and ER records.     Subjective:     Principal Problem:Acute on chronic congestive heart failure    Chief Complaint:   Chief Complaint   Patient presents with    Shortness of Breath     c/o intermittent SOB and mild swelling to her legs for the past few days        HPI: Daria Carter is a 89 yo female with pmh of HTN, T2DM and dementia who presented to ED with complaint of shortness of breath and dyspnea on exertion. Onset 3-4 days ago gradually becoming worse. Denies chest pain, abdominal pain, n/v/d and fever/chills. Labs remarkable for BUN/Cr 34/2.2, BNP 2423, troponin 0.028. CXR shows cardiomegaly with worsening diffuse bilateral airspace opacities, suggestive of worsening CHF pattern of pulmonary edema. -190. EKG shows atrial fibrillation (? new onset). She received 80 mg furosemide. Patient admitted to [facility].     Past Medical History:   Diagnosis Date    Blindness of left eye     Dementia     Diabetes mellitus     Hypertension        Past Surgical History:   Procedure Laterality Date    CATARACT EXTRACTION EXTRACAPSULAR W/ INTRAOCULAR LENS IMPLANTATION Bilateral        Review of patient's allergies indicates:  No Known Allergies    No current facility-administered medications on file prior to encounter.      Current Outpatient Medications on File Prior to Encounter   Medication Sig    ascorbic acid, vitamin C, (VITAMIN C) 1000 MG tablet Take 1,000 mg by mouth once daily.    aspirin (ECOTRIN) 81 MG EC tablet Take 81 mg by mouth once daily.    carvedilol (COREG) 25 MG tablet Take 1 tablet (25 mg total) by mouth 2 (two) times daily.    furosemide (LASIX) 40 MG tablet Take 1  tablet (40 mg total) by mouth daily as needed (for swelling or weight gain of 5 lbs.).    losartan (COZAAR) 25 MG tablet Take 1 tablet (25 mg total) by mouth with lunch.    pantoprazole (PROTONIX) 40 MG tablet Take 1 tablet (40 mg total) by mouth once daily.     Family History     Problem Relation (Age of Onset)    COPD Father    Cancer Mother    Heart disease Mother    Stroke Father        Tobacco Use    Smoking status: Former Smoker    Smokeless tobacco: Never Used   Substance and Sexual Activity    Alcohol use: No    Drug use: No    Sexual activity: Not Currently     Review of Systems   Constitutional: Negative for chills, diaphoresis and fever.   Eyes: Negative for photophobia.   Respiratory: Positive for shortness of breath. Negative for cough, chest tightness and wheezing.    Cardiovascular: Negative for chest pain, palpitations and leg swelling.   Gastrointestinal: Negative for abdominal pain, diarrhea, nausea and vomiting.   Genitourinary: Negative for dysuria, flank pain, frequency and hematuria.   Musculoskeletal: Negative for back pain and myalgias.   Neurological: Negative for dizziness, syncope, light-headedness and headaches.   Psychiatric/Behavioral: Negative for confusion.     Objective:     Vital Signs (Most Recent):  Temp: 96.7 °F (35.9 °C) (07/14/20 0516)  Pulse: 77 (07/14/20 0516)  Resp: 16 (07/14/20 0516)  BP: (!) 179/84 (07/14/20 0516)  SpO2: (!) 82 % (07/14/20 0516) Vital Signs (24h Range):  Temp:  [96.7 °F (35.9 °C)-98 °F (36.7 °C)] 96.7 °F (35.9 °C)  Pulse:  [52-90] 77  Resp:  [12-28] 16  SpO2:  [82 %-100 %] 82 %  BP: (126-191)/() 179/84     Weight: 57.3 kg (126 lb 5.2 oz)  Body mass index is 19.79 kg/m².    Physical Exam  Constitutional:       Appearance: She is well-developed.   HENT:      Head: Normocephalic and atraumatic.   Eyes:      Conjunctiva/sclera: Conjunctivae normal.      Pupils: Pupils are equal, round, and reactive to light.   Neck:      Musculoskeletal: Normal  range of motion.      Vascular: No JVD.   Cardiovascular:      Rate and Rhythm: Normal rate. Rhythm irregular.      Heart sounds: Normal heart sounds.   Pulmonary:      Effort: Pulmonary effort is normal. No respiratory distress.      Breath sounds: No wheezing.   Abdominal:      General: Bowel sounds are normal. There is no distension.      Palpations: Abdomen is soft.      Tenderness: There is no abdominal tenderness. There is no guarding.   Musculoskeletal: Normal range of motion.         General: No tenderness.   Skin:     General: Skin is warm and dry.      Capillary Refill: Capillary refill takes less than 2 seconds.   Neurological:      Mental Status: She is alert and oriented to person, place, and time.   Psychiatric:         Behavior: Behavior normal.           CRANIAL NERVES     CN III, IV, VI   Pupils are equal, round, and reactive to light.       Significant Labs:   BMP:   Recent Labs   Lab 07/13/20 2026   *      K 3.6      CO2 30*   BUN 34*   CREATININE 2.2*   CALCIUM 9.1   MG 1.8     CBC:   Recent Labs   Lab 07/13/20 2026   WBC 3.36*   HGB 11.1*   HCT 35.0*   *     Urine Studies:   Recent Labs   Lab 07/13/20 2237   COLORU Yellow   APPEARANCEUA Clear   PHUR 6.0   SPECGRAV 1.015   PROTEINUA Negative   GLUCUA Negative   KETONESU Negative   BILIRUBINUA Negative   OCCULTUA Negative   NITRITE Negative   UROBILINOGEN Negative   LEUKOCYTESUR Negative       Significant Imaging: I have reviewed all pertinent imaging results/findings within the past 24 hours.    Assessment/Plan:     * Acute on chronic congestive heart failure  -presents with 3-4 day hx of SOB/RANGEL   -BNP 2423, CXR consistent with pulmonary vascular congestion   -no previous echo in epic   -s/p 80 mg furosemide x 1 dose   -continue furosemide 40 mg BID   -low sodium diet, 1.5 ml fluid restriction   -strict intake/output and daily weights   -monitor tele  -check TTE            Atrial fibrillation  ? New onset     HR  controlled, monitor   TTE pending       Type 2 diabetes mellitus  A1C 6.0     Diet controlled   Low dose SSI, accucheck AC&HS, Diabetic diet       SERGE (acute kidney injury)  Bun/Cr 34/2.2 on arrival, baseline Cr 1.3- cardiorenal   Strict intake and output   Avoid nephrotoxic meds, renal dose meds     Essential hypertension  -190     Hold BB in setting on HF exacerbation   Hold Losartan 2/2 SERGE   Give hydralazine prn   Monitor         VTE Risk Mitigation (From admission, onward)         Ordered     IP VTE HIGH RISK PATIENT  Once      07/13/20 2126     Place sequential compression device  Until discontinued      07/13/20 2126                   Megan Smith NP  Department of Hospital Medicine   [facility]

## 2020-07-14 NOTE — ASSESSMENT & PLAN NOTE
Hold BB in setting on HF exacerbation   Hold Losartan 2/2 SERGE   Give hydralazine prn   Monitor

## 2020-07-14 NOTE — PLAN OF CARE
CM spoke with pt's daughter Mei via telephone.  Pt lives at home with daughter Laura Branch who cares for her full time.  Pt ambulates without the use of medical device, has no DME, most recent home health was [place] who has since d/c'd pt.  Pt self feeds.  Daughter states pt has been experiencing paranoid behavior at home, this is not a new occurrence.  She is aware of CHF exacerbation.  Plan at time of d/c is to return home with Ms Branch.  Family plans on visiting later today.    White board updated with CM name & contact info.  Pt encouraged to call with any questions or needs. CM will continue to follow patient throughout the transitions of care, and assist with any discharge needs.         07/14/20 1001   Discharge Assessment   Assessment Type Discharge Planning Assessment   Confirmed/corrected address and phone number on facesheet? Yes   Assessment information obtained from? Caregiver  (Mei Win (Daughter) [phone])   Expected Length of Stay (days) 2   Communicated expected length of stay with patient/caregiver yes   Prior to hospitilization cognitive status: Not Oriented to Place;Not Oriented to Time   Prior to hospitalization functional status: Needs Assistance   Current cognitive status: Not Oriented to Place;Not Oriented to Time   Current Functional Status: Needs Assistance   Lives With child(vasyl), adult  (Laura Branch)   Able to Return to Prior Arrangements yes   Is patient able to care for self after discharge? No   Who are your caregiver(s) and their phone number(s)?   (Mei Win (Daughter) [phone])   Readmission Within the Last 30 Days no previous admission in last 30 days   Patient currently being followed by outpatient case management? No   Patient currently receives any other outside agency services? No   Equipment Currently Used at Home   (TBD)   Do you have any problems affording any of your prescribed medications? No   Is the patient taking medications as prescribed? yes    Does the patient have transportation home? Yes   Transportation Anticipated family or friend will provide   Does the patient receive services at the [facility]? No   Discharge Plan A Home Health   DME Needed Upon Discharge  none   Patient/Family in Agreement with Plan no

## 2020-07-14 NOTE — ASSESSMENT & PLAN NOTE
-190     Hold BB in setting on HF exacerbation   Hold Losartan 2/2 SERGE   Give hydralazine prn   Monitor

## 2020-07-14 NOTE — ED NOTES
TYREE House NP notified pt remains hypertensive after receiving IV Lasix and Hydralazine. Awaiting further orders.

## 2020-07-14 NOTE — ASSESSMENT & PLAN NOTE
-BNP 2423, CXR consistent with pulmonary vascular congestion   -no previous echo in epic   -s/p 80 mg furosemide x 1 dose   -continue furosemide 40 mg BID   -low sodium diet, 1.5 ml fluid restriction   -strict intake/output and daily weights   -monitor tele  -check TTE

## 2020-07-14 NOTE — PROGRESS NOTES
[facility]  Progress Note    Patient Name: Daria Carter  MRN: [id number]  Patient Class: OP- Observation   Admission Date: 7/13/2020  Length of Stay: 0 days  Attending Physician: Bella Cote*  Primary Care Provider: Vanessa Atkins MD        Subjective:     Principal Problem:Acute on chronic congestive heart failure        HPI:  Daria Carter is a 89 yo female with pmh of HTN, T2DM and dementia who presented to ED with complaint of shortness of breath and dyspnea on exertion. Onset 3-4 days ago gradually becoming worse. Denies chest pain, abdominal pain, n/v/d and fever/chills. Labs remarkable for BUN/Cr 34/2.2, BNP 2423, troponin 0.028. CXR shows cardiomegaly with worsening diffuse bilateral airspace opacities, suggestive of worsening CHF pattern of pulmonary edema. -190. EKG shows atrial fibrillation (? new onset). She received 80 mg furosemide. Patient admitted to [facility].     Overview/Hospital Course:  No notes on file    Interval History: awake and alert.   Replace K,continue with Lasix,   Awaits TTE      Review of Systems   Constitutional: Negative for chills, diaphoresis and fever.   Respiratory: Positive for shortness of breath. Negative for cough and wheezing.    Cardiovascular: Negative for chest pain, palpitations and leg swelling.   Gastrointestinal: Negative for abdominal pain, diarrhea, nausea and vomiting.   Genitourinary: Negative for frequency and hematuria.   Musculoskeletal: Negative for back pain and myalgias.   Neurological: Negative for syncope, light-headedness and headaches.   Psychiatric/Behavioral: Negative for confusion.     Objective:     Vital Signs (Most Recent):  Temp: 96.8 °F (36 °C) (07/14/20 0814)  Pulse: 76 (07/14/20 0814)  Resp: 18 (07/14/20 0814)  BP: (!) 156/87 (07/14/20 0814)  SpO2: (!) 94 % (07/14/20 1046) Vital Signs (24h Range):  Temp:  [96.7 °F (35.9 °C)-98 °F (36.7 °C)] 96.8 °F (36 °C)  Pulse:  [52-90]  76  Resp:  [12-28] 18  SpO2:  [82 %-100 %] 94 %  BP: (126-191)/() 156/87     Weight: 57.3 kg (126 lb 5.2 oz)  Body mass index is 19.79 kg/m².    Intake/Output Summary (Last 24 hours) at 7/14/2020 1051  Last data filed at 7/14/2020 0031  Gross per 24 hour   Intake --   Output 305 ml   Net -305 ml      Physical Exam  Constitutional:       Appearance: She is well-developed.   HENT:      Head: Normocephalic and atraumatic.   Neck:      Musculoskeletal: Normal range of motion.      Vascular: No JVD.   Cardiovascular:      Rate and Rhythm: Normal rate. Rhythm irregular.      Heart sounds: Normal heart sounds.   Pulmonary:      Effort: Pulmonary effort is normal. No respiratory distress.      Breath sounds: No wheezing.   Abdominal:      General: Bowel sounds are normal. There is no distension.      Palpations: Abdomen is soft.      Tenderness: There is no abdominal tenderness. There is no guarding.   Musculoskeletal: Normal range of motion.         General: No tenderness.   Skin:     General: Skin is warm and dry.      Capillary Refill: Capillary refill takes less than 2 seconds.   Neurological:      Mental Status: She is alert and oriented to person, place, and time.   Psychiatric:         Behavior: Behavior normal.         Significant Labs:   A1C:   Recent Labs   Lab 07/13/20 2026   HGBA1C 6.0*     ABGs: No results for input(s): PH, PCO2, HCO3, POCSATURATED, BE, TOTALHB, COHB, METHB, O2HB, POCFIO2 in the last 48 hours.  CBC:   Recent Labs   Lab 07/13/20 2026 07/14/20  0539   WBC 3.36* 3.77*   HGB 11.1* 11.4*   HCT 35.0* 35.9*   * 81*     CMP:   Recent Labs   Lab 07/13/20 2026 07/14/20  0539    143   K 3.6 3.2*    101   CO2 30* 30*   * 123*   BUN 34* 32*   CREATININE 2.2* 2.0*   CALCIUM 9.1 9.3   PROT 7.2  --    ALBUMIN 3.2*  --    BILITOT 0.8  --    ALKPHOS 43*  --    AST 16  --    ALT 9*  --    ANIONGAP 10 12   EGFRNONAA 19* 22*     Cardiac Markers:   Recent Labs   Lab 07/13/20 2026    BNP 2,423*     Lactic Acid: No results for input(s): LACTATE in the last 48 hours.  Lipase: No results for input(s): LIPASE in the last 48 hours.  TSH:   Recent Labs   Lab 06/11/20  1141   TSH 1.760     Urine Culture: No results for input(s): LABURIN in the last 48 hours.  Urine Studies:   Recent Labs   Lab 07/13/20  2237   COLORU Yellow   APPEARANCEUA Clear   PHUR 6.0   SPECGRAV 1.015   PROTEINUA Negative   GLUCUA Negative   KETONESU Negative   BILIRUBINUA Negative   OCCULTUA Negative   NITRITE Negative   UROBILINOGEN Negative   LEUKOCYTESUR Negative       Significant Imaging: I have reviewed all pertinent imaging results/findings within the past 24 hours.      Assessment/Plan:      * Acute on chronic congestive heart failure  -BNP 2423, CXR consistent with pulmonary vascular congestion   -no previous echo in epic   -s/p 80 mg furosemide x 1 dose   -continue furosemide 40 mg BID   -low sodium diet, 1.5 ml fluid restriction   -strict intake/output and daily weights   -monitor tele  -check TTE            Atrial fibrillation  ? New onset     HR controlled, monitor   TTE pending       Type 2 diabetes mellitus  A1C 6.0   Diet controlled   Low dose SSI, accucheck AC&HS, Diabetic diet       SERGE (acute kidney injury)  Bun/Cr 34/2.2 on arrival, baseline Cr 1.3- cardiorenal   Strict intake and output   Avoid nephrotoxic meds, renal dose meds     Essential hypertension  Hold BB in setting on HF exacerbation   Hold Losartan 2/2 SERGE   Give hydralazine prn   Monitor         VTE Risk Mitigation (From admission, onward)         Ordered     IP VTE HIGH RISK PATIENT  Once      07/13/20 2126     Place sequential compression device  Until discontinued      07/13/20 2126                      Bella N Innocent-MD Laura  Department of Hospital Medicine   [facility]

## 2020-07-14 NOTE — ASSESSMENT & PLAN NOTE
-presents with 3-4 day hx of SOB/RANGEL   -BNP 2423, CXR consistent with pulmonary vascular congestion   -no previous echo in epic   -s/p 80 mg furosemide x 1 dose   -continue furosemide 40 mg BID   -low sodium diet, 1.5 ml fluid restriction   -strict intake/output and daily weights   -monitor tele  -check TTE

## 2020-07-14 NOTE — PLAN OF CARE
VN note: Care plan, orders and labs reviewed.  VN cued into pt's room for introduction. VN informed pt and daughter that VN would be working along side bedside nurse and PCT throughout shift. Level of present pain assessed. At present no distress noted. Patient appears to be sleeping- resting comfortably. Thoroughly discussed with patient's daughter the plan of care and upcoming discharge. Discussed with patient's daughter High fall risk protocol and interventions that have been initiated and cont be in place for safety. Patient's daughter verbalized clear understanding and cooperation using teach back method. Bed alarm presently activated and in use. Will cont to be available to patient and intervene prn.

## 2020-07-15 VITALS
RESPIRATION RATE: 16 BRPM | TEMPERATURE: 99 F | OXYGEN SATURATION: 95 % | HEIGHT: 67 IN | BODY MASS INDEX: 18.65 KG/M2 | HEART RATE: 73 BPM | SYSTOLIC BLOOD PRESSURE: 134 MMHG | DIASTOLIC BLOOD PRESSURE: 67 MMHG | WEIGHT: 118.81 LBS

## 2020-07-15 LAB
ANION GAP SERPL CALC-SCNC: 7 MMOL/L (ref 8–16)
BASOPHILS # BLD AUTO: 0.01 K/UL (ref 0–0.2)
BASOPHILS NFR BLD: 0.3 % (ref 0–1.9)
BUN SERPL-MCNC: 31 MG/DL (ref 8–23)
CALCIUM SERPL-MCNC: 8.9 MG/DL (ref 8.7–10.5)
CHLORIDE SERPL-SCNC: 104 MMOL/L (ref 95–110)
CO2 SERPL-SCNC: 35 MMOL/L (ref 23–29)
CREAT SERPL-MCNC: 2 MG/DL (ref 0.5–1.4)
DIFFERENTIAL METHOD: ABNORMAL
EOSINOPHIL # BLD AUTO: 0 K/UL (ref 0–0.5)
EOSINOPHIL NFR BLD: 0.3 % (ref 0–8)
ERYTHROCYTE [DISTWIDTH] IN BLOOD BY AUTOMATED COUNT: 14.2 % (ref 11.5–14.5)
EST. GFR  (AFRICAN AMERICAN): 25 ML/MIN/1.73 M^2
EST. GFR  (NON AFRICAN AMERICAN): 22 ML/MIN/1.73 M^2
GLUCOSE SERPL-MCNC: 105 MG/DL (ref 70–110)
HCT VFR BLD AUTO: 36.9 % (ref 37–48.5)
HGB BLD-MCNC: 11 G/DL (ref 12–16)
IMM GRANULOCYTES # BLD AUTO: 0.02 K/UL (ref 0–0.04)
IMM GRANULOCYTES NFR BLD AUTO: 0.6 % (ref 0–0.5)
LYMPHOCYTES # BLD AUTO: 0.5 K/UL (ref 1–4.8)
LYMPHOCYTES NFR BLD: 13.6 % (ref 18–48)
MAGNESIUM SERPL-MCNC: 1.8 MG/DL (ref 1.6–2.6)
MCH RBC QN AUTO: 31.8 PG (ref 27–31)
MCHC RBC AUTO-ENTMCNC: 29.8 G/DL (ref 32–36)
MCV RBC AUTO: 107 FL (ref 82–98)
MONOCYTES # BLD AUTO: 0.4 K/UL (ref 0.3–1)
MONOCYTES NFR BLD: 12.1 % (ref 4–15)
NEUTROPHILS # BLD AUTO: 2.5 K/UL (ref 1.8–7.7)
NEUTROPHILS NFR BLD: 73.1 % (ref 38–73)
NRBC BLD-RTO: 1 /100 WBC
PHOSPHATE SERPL-MCNC: 5.3 MG/DL (ref 2.7–4.5)
PLATELET # BLD AUTO: 74 K/UL (ref 150–350)
PMV BLD AUTO: 13.2 FL (ref 9.2–12.9)
POTASSIUM SERPL-SCNC: 3.7 MMOL/L (ref 3.5–5.1)
RBC # BLD AUTO: 3.46 M/UL (ref 4–5.4)
SODIUM SERPL-SCNC: 146 MMOL/L (ref 136–145)
WBC # BLD AUTO: 3.38 K/UL (ref 3.9–12.7)

## 2020-07-15 PROCEDURE — 27000221 HC OXYGEN, UP TO 24 HOURS

## 2020-07-15 PROCEDURE — 97166 OT EVAL MOD COMPLEX 45 MIN: CPT

## 2020-07-15 PROCEDURE — 85025 COMPLETE CBC W/AUTO DIFF WBC: CPT

## 2020-07-15 PROCEDURE — 80048 BASIC METABOLIC PNL TOTAL CA: CPT

## 2020-07-15 PROCEDURE — 84100 ASSAY OF PHOSPHORUS: CPT

## 2020-07-15 PROCEDURE — 97530 THERAPEUTIC ACTIVITIES: CPT

## 2020-07-15 PROCEDURE — 94761 N-INVAS EAR/PLS OXIMETRY MLT: CPT

## 2020-07-15 PROCEDURE — 97161 PT EVAL LOW COMPLEX 20 MIN: CPT

## 2020-07-15 PROCEDURE — 25000003 PHARM REV CODE 250: Performed by: NURSE PRACTITIONER

## 2020-07-15 PROCEDURE — 83735 ASSAY OF MAGNESIUM: CPT

## 2020-07-15 PROCEDURE — 25000003 PHARM REV CODE 250: Performed by: FAMILY MEDICINE

## 2020-07-15 PROCEDURE — 36415 COLL VENOUS BLD VENIPUNCTURE: CPT

## 2020-07-15 PROCEDURE — 63600175 PHARM REV CODE 636 W HCPCS: Performed by: NURSE PRACTITIONER

## 2020-07-15 RX ORDER — FUROSEMIDE 40 MG/1
40 TABLET ORAL 2 TIMES DAILY
Qty: 60 TABLET | Refills: 11 | Status: SHIPPED | OUTPATIENT
Start: 2020-07-15 | End: 2020-07-15

## 2020-07-15 RX ORDER — AMLODIPINE BESYLATE 5 MG/1
10 TABLET ORAL DAILY
Status: DISCONTINUED | OUTPATIENT
Start: 2020-07-15 | End: 2020-07-15 | Stop reason: HOSPADM

## 2020-07-15 RX ORDER — FUROSEMIDE 40 MG/1
40 TABLET ORAL 2 TIMES DAILY
Status: DISCONTINUED | OUTPATIENT
Start: 2020-07-15 | End: 2020-07-15 | Stop reason: HOSPADM

## 2020-07-15 RX ORDER — LOSARTAN POTASSIUM 25 MG/1
25 TABLET ORAL
Qty: 90 TABLET | Refills: 3 | Status: ON HOLD | OUTPATIENT
Start: 2020-07-20 | End: 2020-08-13 | Stop reason: HOSPADM

## 2020-07-15 RX ORDER — FUROSEMIDE 40 MG/1
40 TABLET ORAL DAILY
Qty: 30 TABLET | Refills: 11 | Status: ON HOLD | OUTPATIENT
Start: 2020-07-15 | End: 2020-08-13 | Stop reason: SDUPTHER

## 2020-07-15 RX ORDER — AMLODIPINE BESYLATE 10 MG/1
10 TABLET ORAL DAILY
Qty: 30 TABLET | Refills: 11 | Status: SHIPPED | OUTPATIENT
Start: 2020-07-16 | End: 2021-07-16

## 2020-07-15 RX ADMIN — FUROSEMIDE 40 MG: 10 INJECTION, SOLUTION INTRAVENOUS at 09:07

## 2020-07-15 RX ADMIN — PANTOPRAZOLE SODIUM 40 MG: 40 TABLET, DELAYED RELEASE ORAL at 09:07

## 2020-07-15 RX ADMIN — AMLODIPINE BESYLATE 10 MG: 5 TABLET ORAL at 01:07

## 2020-07-15 RX ADMIN — ASPIRIN 81 MG: 81 TABLET, COATED ORAL at 09:07

## 2020-07-15 NOTE — DISCHARGE SUMMARY
[facility]  Discharge Summary      Patient Name: Daria Carter  MRN: [id number]  Admission Date: 7/13/2020  Hospital Length of Stay: 1 days  Discharge Date and Time: 7/15/2020  5:56 PM  Attending Physician: Bella Cote*   Discharging Provider: Bella Cote MD  Primary Care Provider: Vanessa Atkins MD      HPI:   Daria Carter is a 89 yo female with pmh of HTN, T2DM and dementia who presented to ED with complaint of shortness of breath and dyspnea on exertion. Onset 3-4 days ago gradually becoming worse. Denies chest pain, abdominal pain, n/v/d and fever/chills. Labs remarkable for BUN/Cr 34/2.2, BNP 2423, troponin 0.028. CXR shows cardiomegaly with worsening diffuse bilateral airspace opacities, suggestive of worsening CHF pattern of pulmonary edema. -190. EKG shows atrial fibrillation (? new onset). She received 80 mg furosemide. Patient admitted to [facility].     * No surgery found *      Hospital Course:   No notes on file     Consults:   Consults (From admission, onward)        Status Ordering Provider     Inpatient consult to [facility]iology[facility]  Once     Provider:  Erick Ceballos MD    Completed BELLA COTE     Inpatient consult to Registered Dietitian/Nutritionist  Once     Provider:  (Not yet assigned)    Completed KYLEE FALLON     Inpatient consult to Social Work/Case Management  Once     Provider:  (Not yet assigned)    Acknowledged KYLEE FALLON          * Acute on chronic congestive heart failure  -BNP 2423, CXR consistent with pulmonary vascular congestion   -no previous echo in epic   -s/p 80 mg furosemide x 1 dose   -continue furosemide 40 mg BID - switch to Po on discharge  -low sodium diet, 1.5 ml fluid restriction   -strict intake/output and daily weights   -monitor tele  -  TTE            Atrial fibrillation  ? New onset     HR controlled, monitor   TTE reviewed   Consult cardiology-  "appreciates rec's- poor candidate for OAC- has dementia, frail and risk for fall.       Type 2 diabetes mellitus  A1C 6.0   Diet controlled   Low dose SSI, accucheck AC&HS, Diabetic diet       SERGE (acute kidney injury)  Bun/Cr 34/2.2 on arrival, baseline Cr 1.3- cardiorenal   Strict intake and output   Avoid nephrotoxic meds, renal dose meds     Essential hypertension  Hold BB in setting on HF exacerbation   Hold Losartan 2/2 SERGE . restart once improved  Give hydralazine prn   Monitor         Final Active Diagnoses:    Diagnosis Date Noted POA    PRINCIPAL PROBLEM:  Acute on chronic congestive heart failure [I50.9] 07/13/2020 Yes    Type 2 diabetes mellitus [E11.9] 07/14/2020 Yes    Atrial fibrillation [I48.91] 07/14/2020 Yes    Heart failure [I50.9] 07/14/2020 Yes    Essential hypertension [I10] 12/30/2017 Yes    SERGE (acute kidney injury) [N17.9] 12/30/2017 Yes      Problems Resolved During this Admission:       Discharged Condition: stable    Disposition: Home-Health Care c    Follow Up:    Patient Instructions:      WALKER FOR HOME USE     Order Specific Question Answer Comments   Type of Walker: Rollator    With wheels? Yes    Height: 5' 7" (1.702 m)    Weight: 53.9 kg (118 lb 13.3 oz)    Length of need (1-99 months): 99    Does patient have medical equipment at home? none    Please check all that apply: Patient's condition impairs ambulation.      Ambulatory referral/consult to [facility]   Standing Status: Future   Referral Priority: Routine Referral Type: Consultation   Referral Reason: Specialty Services Required   Number of Visits Requested: 1     Ambulatory referral/consult to Home Health   Standing Status: Future   Referral Priority: Routine Referral Type: Home Health   Referral Reason: Specialty Services Required   Requested Specialty: Home Health Services   Number of Visits Requested: 1     Diet Dysphagia Mechanical Soft     Activity as tolerated       Significant Diagnostic " Studies:     Pending Diagnostic Studies:     None         Medications:  Reconciled Home Medications:      Medication List      START taking these medications    amLODIPine 10 MG tablet  Commonly known as: NORVASC  Take 1 tablet (10 mg total) by mouth once daily.  Start taking on: July 16, 2020        CHANGE how you take these medications    furosemide 40 MG tablet  Commonly known as: LASIX  Take 1 tablet (40 mg total) by mouth once daily.  What changed:   · when to take this  · reasons to take this     losartan 25 MG tablet  Commonly known as: COZAAR  Take 1 tablet (25 mg total) by mouth with lunch.  Start taking on: July 20, 2020  What changed: These instructions start on July 20, 2020. If you are unsure what to do until then, ask your doctor or other care provider.        CONTINUE taking these medications    aspirin 81 MG EC tablet  Commonly known as: ECOTRIN  Take 81 mg by mouth once daily.     carvediloL 25 MG tablet  Commonly known as: COREG  Take 1 tablet (25 mg total) by mouth 2 (two) times daily.     pantoprazole 40 MG tablet  Commonly known as: PROTONIX  Take 1 tablet (40 mg total) by mouth once daily.     VITAMIN C 1000 MG tablet  Generic drug: ascorbic acid (vitamin C)  Take 1,000 mg by mouth once daily.            Indwelling Lines/Drains at time of discharge:   Lines/Drains/Airways     Drain            Female External Urinary Catheter 07/13/20 2237 1 day                Time spent on the discharge of patient: 35 minutes  Patient was seen and examined on the date of discharge and determined to be suitable for discharge.         Bella Cote MD  Department of Hospital Medicine  [facility]

## 2020-07-15 NOTE — PLAN OF CARE
07/15/20 1616   Final Note   Assessment Type Final Discharge Note   Anticipated Discharge Disposition Home-Health   Hospital Follow Up  Appt(s) scheduled? Yes   Discharge plans and expectations educations in teach back method with documentation complete? Yes   Post-Acute Status   Post-Acute Authorization Home Health  ([place] )   Home Health Status Set-up Complete       Genny Bundy, RN    [phone]

## 2020-07-15 NOTE — PT/OT/SLP EVAL
"Physical Therapy Evaluation/Treatment    Patient Name:  Daria Carter   MRN:  [id number]    Recommendations:     Discharge Recommendations:  (likely HH PT/OT)   Discharge Equipment Recommendations: bedside commode, walker, rolling   Barriers to discharge: Pt requires increased level of assistance- dtr is pt's caregiver and reports that she should be able to manage pt at her present level of function    Assessment:     Daria Carter is a 89 y.o. female admitted with a medical diagnosis of Acute on chronic congestive heart failure.  She presents with the following impairments/functional limitations:  weakness, impaired endurance, impaired self care skills, impaired functional mobilty, gait instability, impaired balance, impaired cognition, decreased upper extremity function, decreased lower extremity function, decreased ROM, decreased safety awareness, impaired cardiopulmonary response to activity Pt will benefit from acute PT services while in hospital to address deficits with functional mobility; spoke with dtr who reports that according to how her mother is functioning at this point, she should be able to manage her at home with HH PT/OT; will possibly need RW, BSC. .    Rehab Prognosis: Good; patient would benefit from acute skilled PT services to address these deficits and reach maximum level of function.    Recent Surgery: * No surgery found *      Plan:     During this hospitalization, patient to be seen 6 x/week to address the identified rehab impairments via gait training, therapeutic activities, therapeutic exercises, neuromuscular re-education and progress toward the following goals:    · Plan of Care Expires:  08/15/20    Subjective     Chief Complaint: "I'm cold."  Patient/Family Comments/goals: Dtr reports that the pt always "plops" herself down in the chair  Pain/Comfort:  · Pain Rating 1: 0/10  · Pain Rating Post-Intervention 1: 0/10    Patients cultural, spiritual, Nondenominational conflicts given the current " situation: no    Living Environment:  Pt lives with daughter in [facility] raised basement, 5STE, tub/sh but daughter reports pt does not like baths/showers and much prefers sponge baths  Previous level of function: Supervision for functional mobility; min A for LE dressing to don B socks but pt able to don all other clothing; does not use DME to amb  Roles and Routines: Caretaker to self, light meal prep, enjoys singing and going to [employer] services  Equipment Used at Home:  none  Assistance upon Discharge: Family    Objective:     Communicated with nurse prior to session.  Patient found with bed alarm, telemetry, peripheral IV  upon PT entry to room.    General Precautions: Standard, fall   Orthopedic Precautions:N/A   Braces: N/A     Exams:  · Cognitive Exam:  Patient is oriented to Person  · Gross Motor Coordination:  mild decrease 2/2 somewhat lethargic   · Postural Exam:  Patient presented with the following abnormalities:    · -       Rounded shoulders  · -       Forward head  · -       Posterior pelvic tilt  · -       mild kyphosis  · Skin Integrity/Edema:      · -       Skin integrity: Visible skin intact  · RLE ROM: WFL  · RLE Strength: 3+/4- proximal to 4 distal grossly  · LLE ROM: WFL  · LLE Strength: 3+/4- proximal to 4 distal grossly    Functional Mobility:  · Bed Mobility:     · Rolling Left:  contact guard assistance and minimum assistance  · Scooting: contact guard assistance  · Supine to Sit: contact guard assistance and minimum assistance  · Sit to Supine: contact guard assistance  · Transfers:     · Sit to Stand:  contact guard assistance and minimum assistance with hand-held assist; CGA using RW with VCs/TCs for hand placement  · Gait: Patient amb with HHA/Milton ~12 ft in room with slow brown, decreased step length, decreased floor clearance, decreased fluidity of movement, slight posterior lean, VCs for head/chest lift; using RW, pt amb 12ft with CGA/Milton for RW management with improved step length,  VCs for technique  · Balance: sit~fair; stand~fair to fair-; amb~fair to fair-    Therapeutic Activities and Exercises:   Patient and dtr educated on role of PT/POC; pt took a little coaxing to participate; transitioned to sitting EOB; holds head down and with PPT, mild kyphosis; /67 HR 81; performed skills as described above; sitting BP after activity 123/58 HR 68; pt performed activity on RA but O2 sats unable to register on pulse oximeter, however, no overt SOB; instructed pt on use of RW and VCs for effective technique; dtr present for instruction; offered gt/txf belt to dtr for going up steps but dtr declined.    AM-PAC 6 CLICK MOBILITY  Total Score:18     Patient left HOB elevated with all lines intact, call button in reach, bed alarm on, nurse notified and dtr present.    GOALS:   Multidisciplinary Problems     Physical Therapy Goals        Problem: Physical Therapy Goal    Goal Priority Disciplines Outcome Goal Variances Interventions   Physical Therapy Goal     PT, PT/OT Ongoing, Progressing     Description: Goals to be met by: 8/15/2020     Patient will increase functional independence with mobility by performin. Supine to sit with supervision  2. Sit to supine with supervision  3. Rolling with modified independent  4. Sit to stand transfer with Supervision with or without AD  5. Bed to chair transfer with Supervision with or without AD  6. Gait  x 75 feet with Supervision with or without AD  7. Ascend/descend 5 stair with bilateral Handrails Stand-by Assistance and Contact Guard Assistance   8. Tolerated BLE AROM ex 2 x 10 reps with supervision                     History:     Past Medical History:   Diagnosis Date    Blindness of left eye     Dementia     Diabetes mellitus     Hypertension        Past Surgical History:   Procedure Laterality Date    CATARACT EXTRACTION EXTRACAPSULAR W/ INTRAOCULAR LENS IMPLANTATION Bilateral        Time Tracking:     PT Received On: 07/15/20  PT Start  Time: 1305     PT Stop Time: 1349  PT Total Time (min): 44 min (session interrupted x 14 minutes)    Billable Minutes: Evaluation 15 minutes and Therapeutic Activity 15 minutes      Eliza Andrew, PT  07/15/2020

## 2020-07-15 NOTE — ASSESSMENT & PLAN NOTE
? Chronicity   Rate control strategy given her advanced age, dementia, and comorbidities   CHADSVASC 6+  HASBLED 3+  Patient with dementia, very frail, at high risk for falls with injury. Poor candidate for OAC

## 2020-07-15 NOTE — PLAN OF CARE
Problem: Physical Therapy Goal  Goal: Physical Therapy Goal  Description: Goals to be met by: 8/15/2020     Patient will increase functional independence with mobility by performin. Supine to sit with supervision  2. Sit to supine with supervision  3. Rolling with modified independent  4. Sit to stand transfer with Supervision with or without AD  5. Bed to chair transfer with Supervision with or without AD  6. Gait  x 75 feet with Supervision with or without AD  7. Ascend/descend 5 stair with bilateral Handrails Stand-by Assistance and Contact Guard Assistance   8. Tolerated BLE AROM ex 2 x 10 reps with supervision    Outcome: Ongoing, Progressing   Pt will benefit from acute PT services while in hospital to address deficits with functional mobility; spoke with dtr who reports that according to how her mother is functioning at this point, she should be able to manage her at home with HH PT/OT; will possibly need RW, BSC.

## 2020-07-15 NOTE — PLAN OF CARE
Problem: Occupational Therapy Goal  Goal: Occupational Therapy Goal  Description: Goals to be met by: 08/15/2020      Patient will increase functional independence with ADLs by performing:    UE Dressing with Supervision.  LE Dressing with Minimal Assistance.  Grooming while standing at sink with Stand-by Assistance.  Toileting from bedside commode with Stand-by Assistance for hygiene and clothing management.   Toilet transfer to bedside commode with Supervision.  Increased functional strength to WFL for self care.  Upper extremity exercise program x8 reps per handout, with assistance as needed.     Outcome: Ongoing, Progressing   Pt would benefit from continued OT to address deficits in self care and functional mobility. Recommending TBD at this time pending progress with therapies; likely HHOT/PT if caregiver (daughter) feels that she can care for pt at functional level at time of d/c; DME needs possibly DI, BSC

## 2020-07-15 NOTE — ASSESSMENT & PLAN NOTE
SBP 180s-190s  Agree with PRN hydralazine for SBP >175  Given her age and comorbidities, will allow for higher BP  Continue BB, ARB (if renal function permits)

## 2020-07-15 NOTE — ASSESSMENT & PLAN NOTE
Hold BB in setting on HF exacerbation   Hold Losartan 2/2 SERGE . restart once improved  Give hydralazine prn   Monitor

## 2020-07-15 NOTE — PROGRESS NOTES
[facility] Hospital Medicine  Progress Note    Patient Name: Daria Carter  MRN: [id number]  Patient Class: IP- Inpatient   Admission Date: 7/13/2020  Length of Stay: 1 days  Attending Physician: Bella Cote*  Primary Care Provider: Vanessa Atkins MD        Subjective:     Principal Problem:Acute on chronic congestive heart failure        HPI:  Daria Carter is a 89 yo female with pmh of HTN, T2DM and dementia who presented to ED with complaint of shortness of breath and dyspnea on exertion. Onset 3-4 days ago gradually becoming worse. Denies chest pain, abdominal pain, n/v/d and fever/chills. Labs remarkable for BUN/Cr 34/2.2, BNP 2423, troponin 0.028. CXR shows cardiomegaly with worsening diffuse bilateral airspace opacities, suggestive of worsening CHF pattern of pulmonary edema. -190. EKG shows atrial fibrillation (? new onset). She received 80 mg furosemide. Patient admitted to [facility] hospital medicine.     Overview/Hospital Course:  No notes on file    Interval History: awake and alert, talkative this morning  PT/OT  Replace K, continue with Lasix, - switch to Po   Add Norvasc  Change diet to soft mechanical   TTE- reviewed      Review of Systems   Constitutional: Negative for chills, diaphoresis and fever.   Respiratory: Positive for shortness of breath. Negative for cough and wheezing.    Cardiovascular: Negative for chest pain, palpitations and leg swelling.   Gastrointestinal: Negative for abdominal pain, diarrhea, nausea and vomiting.   Genitourinary: Negative for frequency and hematuria.   Musculoskeletal: Negative for back pain and myalgias.   Neurological: Negative for syncope, light-headedness and headaches.   Psychiatric/Behavioral: Negative for confusion.     Objective:     Vital Signs (Most Recent):  Temp: 97.9 °F (36.6 °C) (07/15/20 0722)  Pulse: (!) 52 (07/15/20 0800)  Resp: 16 (07/15/20 0722)  BP: (!) 195/85 (07/15/20 0722)  SpO2: (!) 94 % (07/15/20 0800)  Vital Signs (24h Range):  Temp:  [96.7 °F (35.9 °C)-98.6 °F (37 °C)] 97.9 °F (36.6 °C)  Pulse:  [52-82] 52  Resp:  [16-20] 16  SpO2:  [93 %-100 %] 94 %  BP: (132-195)/(66-95) 195/85     Weight: 53.9 kg (118 lb 13.3 oz)  Body mass index is 18.61 kg/m².    Intake/Output Summary (Last 24 hours) at 7/15/2020 1107  Last data filed at 7/15/2020 0600  Gross per 24 hour   Intake 60 ml   Output 505 ml   Net -445 ml      Physical Exam  Constitutional:       Appearance: She is well-developed.   HENT:      Head: Normocephalic and atraumatic.   Neck:      Musculoskeletal: Normal range of motion.      Vascular: No JVD.   Cardiovascular:      Rate and Rhythm: Normal rate. Rhythm irregular.      Heart sounds: Normal heart sounds.   Pulmonary:      Effort: Pulmonary effort is normal. No respiratory distress.      Breath sounds: No wheezing.   Abdominal:      General: Bowel sounds are normal. There is no distension.      Palpations: Abdomen is soft.      Tenderness: There is no abdominal tenderness. There is no guarding.   Musculoskeletal: Normal range of motion.         General: No tenderness.   Skin:     General: Skin is warm and dry.      Capillary Refill: Capillary refill takes less than 2 seconds.   Neurological:      Mental Status: She is alert and oriented to person, place, and time.   Psychiatric:         Behavior: Behavior normal.         Significant Labs:   A1C:   Recent Labs   Lab 07/13/20 2026   HGBA1C 6.0*     ABGs: No results for input(s): PH, PCO2, HCO3, POCSATURATED, BE, TOTALHB, COHB, METHB, O2HB, POCFIO2 in the last 48 hours.  CBC:   Recent Labs   Lab 07/13/20 2026 07/14/20  0539 07/15/20  0825   WBC 3.36* 3.77* 3.38*   HGB 11.1* 11.4* 11.0*   HCT 35.0* 35.9* 36.9*   * 81* 74*     CMP:   Recent Labs   Lab 07/13/20 2026 07/14/20  0539 07/15/20  0825    143 146*   K 3.6 3.2* 3.7    101 104   CO2 30* 30* 35*   * 123* 105   BUN 34* 32* 31*   CREATININE 2.2* 2.0* 2.0*   CALCIUM 9.1 9.3 8.9    PROT 7.2  --   --    ALBUMIN 3.2*  --   --    BILITOT 0.8  --   --    ALKPHOS 43*  --   --    AST 16  --   --    ALT 9*  --   --    ANIONGAP 10 12 7*   EGFRNONAA 19* 22* 22*     Cardiac Markers:   Recent Labs   Lab 07/13/20 2026   BNP 2,423*     Lactic Acid: No results for input(s): LACTATE in the last 48 hours.  Lipase: No results for input(s): LIPASE in the last 48 hours.  TSH:   Recent Labs   Lab 06/11/20  1141   TSH 1.760     Urine Culture: No results for input(s): LABURIN in the last 48 hours.  Urine Studies:   Recent Labs   Lab 07/13/20  2237   COLORU Yellow   APPEARANCEUA Clear   PHUR 6.0   SPECGRAV 1.015   PROTEINUA Negative   GLUCUA Negative   KETONESU Negative   BILIRUBINUA Negative   OCCULTUA Negative   NITRITE Negative   UROBILINOGEN Negative   LEUKOCYTESUR Negative       Significant Imaging: I have reviewed all pertinent imaging results/findings within the past 24 hours.      Assessment/Plan:      * Acute on chronic congestive heart failure  -BNP 2423, CXR consistent with pulmonary vascular congestion   -no previous echo in epic   -s/p 80 mg furosemide x 1 dose   -continue furosemide 40 mg BID   -low sodium diet, 1.5 ml fluid restriction   -strict intake/output and daily weights   -monitor tele  -check TTE            Atrial fibrillation  ? New onset     HR controlled, monitor   TTE pending       Type 2 diabetes mellitus  A1C 6.0   Diet controlled   Low dose SSI, accucheck AC&HS, Diabetic diet       SERGE (acute kidney injury)  Bun/Cr 34/2.2 on arrival, baseline Cr 1.3- cardiorenal   Strict intake and output   Avoid nephrotoxic meds, renal dose meds     Essential hypertension  Hold BB in setting on HF exacerbation   Hold Losartan 2/2 SERGE   Give hydralazine prn   Monitor         VTE Risk Mitigation (From admission, onward)         Ordered     IP VTE HIGH RISK PATIENT  Once      07/13/20 2126     Place sequential compression device  Until discontinued      07/13/20 2126                      Bella ALLISON  Innocent-MD Laura  Department of Hospital Medicine   [facility]

## 2020-07-15 NOTE — HOSPITAL COURSE
07/15/2020 LVEF 35% with global hypokinesis, mild to moderate valvular abnormalities, and small pericardial effusion. Diuresing with Lasix 40 mg IV BID and is net -690 cc from admission.

## 2020-07-15 NOTE — ASSESSMENT & PLAN NOTE
Previously followed by cardiology at [facility]- records are unavailable   LVEF 35% with global hypokinesis- unknown if this is new but given her age, dementia, and comorbidities will defer ischemic work up and treat with conservative management   Agree with Palliative medicine consult for GOC discussion   Diuresing with Lasix 40 mg IV BID (prescribed 40 mg daily at home)  Continue BB, ARB- if renal function permits     Daily weights, Accurate intake and output

## 2020-07-15 NOTE — HPI
HPI retrieved from chart, patient is confused- no family at bedside.   Daria Carter is a 89 yo female with pmh of HTN, T2DM and dementia who presented to ED with complaint of shortness of breath and dyspnea on exertion. Onset 3-4 days ago gradually becoming worse. Denied chest pain, abdominal pain, n/v/d and fever/chills. Labs remarkable for BUN/Cr 34/2.2, BNP 2423, troponin 0.028. CXR shows cardiomegaly with pulmonary edema. -190. EKG  atrial fibrillation (? new onset). She received 80 mg furosemide. Patient previously followed by [facility] cardiology- records are unavailable.

## 2020-07-15 NOTE — ASSESSMENT & PLAN NOTE
-BNP 2423, CXR consistent with pulmonary vascular congestion   -no previous echo in epic   -s/p 80 mg furosemide x 1 dose   -continue furosemide 40 mg BID - switch to Po on discharge  -low sodium diet, 1.5 ml fluid restriction   -strict intake/output and daily weights   -monitor tele  -  TTE

## 2020-07-15 NOTE — PLAN OF CARE
"Visit with pt, daughter at bedside.  Daughter states after family discussion they have chosen to resume home health.  Daughter states pt seems to be back at her baseline and are "comfortable with things like they are".  States pt was with a hospice company a few years ago and feel family was able to provide more care.  Daughter describes a quality of life where pt ambulates independently at home, verbalizes her needs, interacts with family.  States family will transition to hospice when they feel pt is ready.    Pt to resume [facility], awaiting orders.  Family agreeable to [facility], referral has been placed.  Rollator delivered to room.  Pt has no additional DME needs.    Genny Bundy, RN    [phone]  "

## 2020-07-15 NOTE — ASSESSMENT & PLAN NOTE
Cr 2.0 baseline ~1.1-1.3  Likely cardiorenal in etiology and would expect improvement with volume removal. Ok to hold ARB and use hydralazine for BP control if Cr is unstable

## 2020-07-15 NOTE — CONSULTS
Food & Nutrition  Education    Diet Education: low Na fluid restriction  Time Spent:   Learners: pt      Nutrition Education provided with handouts: Heart Failure Nutrition Therapy      Comments:  Pt was asleep at visit. Did not awaken upon name call. No family at bedside. Left diet handouts at bedside. Will follow up.    All questions and concerns answered. Dietitian's contact information provided.       Follow-Up: 7/16/20    Please Re-consult as needed        Thanks!  Lazara Mace RD, LDN

## 2020-07-15 NOTE — PLAN OF CARE
Pt in bed resting. No signs of distress noted. Reviewed POC with pt. Verbalized understanding. Not as talkative as the day before. Call bell in reach, bed lowest position, all safety precautions in place. WCTM.

## 2020-07-15 NOTE — PT/OT/SLP EVAL
Occupational Therapy   Evaluation    Name: Daria Carter  MRN: [id number]  Admitting Diagnosis:  Acute on chronic congestive heart failure      Recommendations:     Discharge Recommendations: (TBD pending progress, likely HHOT/PT)  Discharge Equipment Recommendations:  bedside commode, walker, rolling  Barriers to discharge:  (Pt requires increased level of assistance; caregiver (daughter) states that she is 71 and will need pt to return close to PLOF for her to continue to care for pt)    Assessment:     Daria Carter is a 89 y.o. female with a medical diagnosis of Acute on chronic congestive heart failure.  She presents with deconditioning, lethargy limiting occupational performance. Performance deficits affecting function: weakness, impaired endurance, impaired self care skills, impaired functional mobilty, decreased coordination, impaired cognition, impaired balance, gait instability, decreased upper extremity function, decreased lower extremity function, decreased ROM, decreased safety awareness, impaired cardiopulmonary response to activity.      Rehab Prognosis: Good; patient would benefit from acute skilled OT services to address these deficits and reach maximum level of function.       Plan:     Patient to be seen 5 x/week to address the above listed problems via self-care/home management, therapeutic activities, therapeutic exercises  · Plan of Care Expires: 08/15/20  · Plan of Care Reviewed with: patient, daughter, caregiver    Subjective     Chief Complaint: Pt states that she is very cold  Patient/Family Comments/goals: To return to PLOF    Occupational Profile:  Living Environment: Pt lives with daughter in [facility] raised basement, 5STE, tub/sh but daughter reports pt does not like baths/showers and much prefers sponge baths  Previous level of function: Supervision for functional mobility; min A for LE dressing to don B socks but pt able to don all other clothing  Roles and Routines: Caretaker to self, light  meal prep, enjoys singing and going to [employer] services  Equipment Used at Home:  none  Assistance upon Discharge: Family    Pain/Comfort:  · Pain Rating 1: 0/10    Patients cultural, spiritual, Alevism conflicts given the current situation:      Objective:     Communicated with: kayley prior to session.  Patient found HOB elevated with bed alarm, telemetry, peripheral IV upon OT entry to room.    General Precautions: Standard, fall   Orthopedic Precautions:N/A   Braces: N/A     Occupational Performance:    Bed Mobility:    · Patient completed Rolling/Turning to Right with moderate assistance  · Patient completed Scooting/Bridging with stand by assistance  · Patient completed Supine to Sit with moderate assistance  · Patient completed Sit to Supine with minimum assistance    Functional Mobility/Transfers:  · Patient completed Sit <> Stand Transfer with contact guard assistance and minimum assistance  with  hand-held assist   Functional Mobility: Pt with fair- dynamic seated and standing balance. Posterior lean in static and dynamic seated trials     Activities of Daily Living:  · Upper Body Dressing: maximal assistance to don gown as robe seated EOB  · Lower Body Dressing: total assistance to don/doff B socks seated EOB    Cognitive/Visual Perceptual:  Cognitive/Psychosocial Skills:     -       Oriented to: First name only   -       Follows Commands/attention:Follows one to two  commands  -       Communication: limited   -       Memory: No Deficits noted  -       Safety awareness/insight to disability: intact   -       Mood/Affect/Coping skills/emotional control: Appropriate to situation     Physical Exam:  Postural examination/scapula alignment:    -       Rounded shoulder, mild kyphosis, forward head, posterior pelvic tilt   Skin integrity: Visible skin intact  Motor Planning:    -       WFL  Dominant hand:    -       R handed  Upper Extremity Range of Motion: BUE WFL except B shoulder flex ~0-90*    Upper  Extremity Strength:  BUE proximally 2/5, distally 3 to 3+/5   Strength:  B hands 3+/5  Fine Motor Coordination:    -       Impaired in functional task practice but may be limited by cognition and command following at this time  Gross motor coordination:   WFL     AMPAC 6 Click ADL:  AMPAC Total Score: 16    Treatment & Education:  Pt educated on role of OT and POC.   Pt performing skills as listed above.   Pt's daughter reports occasional urinary incontinence in day time but states that pt is Supervision to go to toilet for BMs. OT suggested possiblity of use of BSC near pt's preferred day-time chair or over toilet. Pt's daughter verbalized understanding of all education this date.  OT spoke with Case Management Re: DME. Pt has been provided with rollator per family request to use as transport w/c. OT suggested transport w/c as pt is unsafe for ambulation with rollator and there are safety concerns for use of rollator as transport w/c.   Education:    Patient left left sidelying with all lines intact, call button in reach, bed alarm on, nsg notified and nsg present    GOALS:   Multidisciplinary Problems     Occupational Therapy Goals        Problem: Occupational Therapy Goal    Goal Priority Disciplines Outcome Interventions   Occupational Therapy Goal     OT, PT/OT Ongoing, Progressing    Description: Goals to be met by: 08/15/2020      Patient will increase functional independence with ADLs by performing:    UE Dressing with Supervision.  LE Dressing with Minimal Assistance.  Grooming while standing at sink with Stand-by Assistance.  Toileting from bedside commode with Stand-by Assistance for hygiene and clothing management.   Toilet transfer to bedside commode with Supervision.  Increased functional strength to WF for self care.  Upper extremity exercise program x8 reps per handout, with assistance as needed.                      History:     Past Medical History:   Diagnosis Date    Blindness of left eye      Dementia     Diabetes mellitus     Hypertension        Past Surgical History:   Procedure Laterality Date    CATARACT EXTRACTION EXTRACAPSULAR W/ INTRAOCULAR LENS IMPLANTATION Bilateral        Time Tracking:     OT Date of Treatment: 07/15/20  OT Start Time: 1120  OT Stop Time: 1147  OT Total Time (min): 27 min    Billable Minutes:Evaluation 14  Therapeutic Activity 13    Dina Suarez OT  7/15/2020

## 2020-07-15 NOTE — ASSESSMENT & PLAN NOTE
? New onset     HR controlled, monitor   TTE reviewed   Consult cardiology- appreciates rec's- poor candidate for OAC- has dementia, frail and risk for fall.

## 2020-07-15 NOTE — PLAN OF CARE
"Referral sent to [facility]  for resumption of care.  Orders in Epic.    Rollator delivered to room, family states pt had a RW at home, they wish to have rollator as they find useful for her to sit on at home while ambulating.  CM asked daughter Mei if they desired BSC, Mei states they prefer pt to be as independent as possible and declines at this time.  States "we want her to walk as much as possible".   At this point, home health PT/OT can assess home for safety and additional DME needs.       07/15/20 5955   Post-Acute Status   Post-Acute Authorization Home Health   Home Health Status Referrals Sent     "

## 2020-07-15 NOTE — SUBJECTIVE & OBJECTIVE
Past Medical History:   Diagnosis Date    Blindness of left eye     Dementia     Diabetes mellitus     Hypertension        Past Surgical History:   Procedure Laterality Date    CATARACT EXTRACTION EXTRACAPSULAR W/ INTRAOCULAR LENS IMPLANTATION Bilateral        Review of patient's allergies indicates:  No Known Allergies    No current facility-administered medications on file prior to encounter.      Current Outpatient Medications on File Prior to Encounter   Medication Sig    ascorbic acid, vitamin C, (VITAMIN C) 1000 MG tablet Take 1,000 mg by mouth once daily.    aspirin (ECOTRIN) 81 MG EC tablet Take 81 mg by mouth once daily.    carvedilol (COREG) 25 MG tablet Take 1 tablet (25 mg total) by mouth 2 (two) times daily.    furosemide (LASIX) 40 MG tablet Take 1 tablet (40 mg total) by mouth daily as needed (for swelling or weight gain of 5 lbs.).    losartan (COZAAR) 25 MG tablet Take 1 tablet (25 mg total) by mouth with lunch.    pantoprazole (PROTONIX) 40 MG tablet Take 1 tablet (40 mg total) by mouth once daily.     Family History     Problem Relation (Age of Onset)    COPD Father    Cancer Mother    Heart disease Mother    Stroke Father        Tobacco Use    Smoking status: Former Smoker    Smokeless tobacco: Never Used   Substance and Sexual Activity    Alcohol use: No    Drug use: No    Sexual activity: Not Currently     Review of Systems   Unable to perform ROS: dementia     Objective:     Vital Signs (Most Recent):  Temp: 97.9 °F (36.6 °C) (07/15/20 0722)  Pulse: (!) 52 (07/15/20 0800)  Resp: 16 (07/15/20 0722)  BP: (!) 195/85 (07/15/20 0722)  SpO2: (!) 94 % (07/15/20 0800) Vital Signs (24h Range):  Temp:  [96.7 °F (35.9 °C)-98.6 °F (37 °C)] 97.9 °F (36.6 °C)  Pulse:  [52-82] 52  Resp:  [16-20] 16  SpO2:  [93 %-100 %] 94 %  BP: (132-195)/(66-95) 195/85     Weight: 53.9 kg (118 lb 13.3 oz)  Body mass index is 18.61 kg/m².    SpO2: (!) 94 %  O2 Device (Oxygen Therapy): nasal  cannula      Intake/Output Summary (Last 24 hours) at 7/15/2020 1011  Last data filed at 7/15/2020 0600  Gross per 24 hour   Intake 60 ml   Output 505 ml   Net -445 ml       Lines/Drains/Airways     Drain            Female External Urinary Catheter 07/13/20 2239 1 day          Peripheral Intravenous Line                 Peripheral IV - Single Lumen 07/13/20 2025 20 G Right Forearm 1 day                Physical Exam   Constitutional: No distress.   HENT:   Head: Atraumatic.   Eyes: Right eye exhibits no discharge. Left eye exhibits no discharge.   Neck: JVD present.   Cardiovascular: Normal rate. An irregularly irregular rhythm present.   Murmur heard.  Pulmonary/Chest: She has decreased breath sounds. She has rales.   Abdominal: Bowel sounds are normal.   Musculoskeletal:         General: Edema present.   Neurological: She is alert. She is disoriented.   Skin: Skin is warm and dry. She is not diaphoretic.   Psychiatric: Cognition and memory are impaired. She is noncommunicative.       Significant Labs:   BMP:   Recent Labs   Lab 07/13/20 2026 07/14/20 0539 07/15/20  0825   * 123* 105    143 146*   K 3.6 3.2* 3.7    101 104   CO2 30* 30* 35*   BUN 34* 32* 31*   CREATININE 2.2* 2.0* 2.0*   CALCIUM 9.1 9.3 8.9   MG 1.8 1.7 1.8   , CMP   Recent Labs   Lab 07/13/20 2026 07/14/20  0539 07/15/20  0825    143 146*   K 3.6 3.2* 3.7    101 104   CO2 30* 30* 35*   * 123* 105   BUN 34* 32* 31*   CREATININE 2.2* 2.0* 2.0*   CALCIUM 9.1 9.3 8.9   PROT 7.2  --   --    ALBUMIN 3.2*  --   --    BILITOT 0.8  --   --    ALKPHOS 43*  --   --    AST 16  --   --    ALT 9*  --   --    ANIONGAP 10 12 7*   ESTGFRAFRICA 22* 25* 25*   EGFRNONAA 19* 22* 22*   , CBC   Recent Labs   Lab 07/13/20 2026 07/14/20  0539 07/15/20  0825   WBC 3.36* 3.77* 3.38*   HGB 11.1* 11.4* 11.0*   HCT 35.0* 35.9* 36.9*   * 81* 74*   , INR No results for input(s): INR, PROTIME in the last 48 hours., Lipid Panel No  results for input(s): CHOL, HDL, LDLCALC, TRIG, CHOLHDL in the last 48 hours. and   Pathology Results  (Last 10 years)    None          Significant Imaging: Echocardiogram:   Transthoracic echo (TTE) complete (Cupid Only):   Results for orders placed or performed during the hospital encounter of 07/13/20   Echo Color Flow Doppler? Yes   Result Value Ref Range    AV mean gradient 4 mmHg    Ao peak gael 1.47 m/s    Ao VTI 33.30 cm    IVRT 125.59 msec    IVS 1.05 0.6 - 1.1 cm    LA size 4.25 cm    Left Atrium Major Axis 5.56 cm    Left Atrium Minor Axis 5.32 cm    LVIDD 4.89 3.5 - 6.0 cm    LVIDS 3.71 2.1 - 4.0 cm    LVOT diameter 2.06 cm    LVOT peak VTI 17.90 cm    PW 1.33 (A) 0.6 - 1.1 cm    MV Peak A Gael 1.04 m/s    E wave decelartion time 327.09 msec    MV Peak E Gael 0.55 m/s    RVDD 2.58 cm    TDI LATERAL 0.03 m/s    TDI SEPTAL 0.03 m/s    LA WIDTH 3.11 cm    Ao root annulus 2.85 cm    AORTIC VALVE CUSP SEPERATION 1.79 cm    PV PEAK VELOCITY 0.51 cm/s    MV stenosis pressure 1/2 time 94.86 ms    LV Diastolic Volume 112.54 mL    LV Systolic Volume 58.39 mL    LVOT peak gael 0.81 m/s    Mr max gael 0.07 m/s    LV LATERAL E/E' RATIO 18.33 m/s    LV SEPTAL E/E' RATIO 18.33 m/s    FS 24 %    LA volume 61.09 cm3    LV mass 223.00 g    Left Ventricle Relative Wall Thickness 0.54 cm    AV valve area 1.79 cm2    AV Velocity Ratio 0.55     AV index (prosthetic) 0.54     MV valve area p 1/2 method 2.32 cm2    E/A ratio 0.53     Mean e' 0.03 m/s    LVOT area 3.3 cm2    LVOT stroke volume 59.63 cm3    AV peak gradient 9 mmHg    E/E' ratio 18.33 m/s    LV Systolic Volume Index 35.1 mL/m2    LV Diastolic Volume Index 67.73 mL/m2    LA Volume Index 36.8 mL/m2    LV Mass Index 134 g/m2    BSA 1.64 m2    Right Atrial Pressure (from IVC) 8 mmHg    AV regurgitation pressure 1/2 time 650 ms    Narrative    · Left ventricular systolic function. The estimated ejection fraction is   35%.  · Global hypokinetic wall motion.  · Grade I (mild)  left ventricular diastolic dysfunction consistent with   impaired relaxation.  · Concentric left ventricular hypertrophy.  · Normal right ventricular systolic function.  · Mild-to-moderate mitral regurgitation.  · Mild to moderate pulmonic regurgitation.  · Moderate tricuspid regurgitation.  · Mild aortic regurgitation.  · Mild aortic valve stenosis.  · Aortic valve area is 1.79 cm2; peak velocity is 1.47 m/s; mean gradient   is 4 mmHg.  · Intermediate central venous pressure (8 mmHg).  · Small circumferential pericardial effusion. Effusion is fluid.  · There is a bilateral pleural effusion.

## 2020-07-15 NOTE — SUBJECTIVE & OBJECTIVE
Interval History: awake and alert, talkative this morning  PT/OT  Replace K, continue with Lasix, - switch to Po   Add Norvasc  Change diet to soft mechanical   TTE- reviewed      Review of Systems   Constitutional: Negative for chills, diaphoresis and fever.   Respiratory: Positive for shortness of breath. Negative for cough and wheezing.    Cardiovascular: Negative for chest pain, palpitations and leg swelling.   Gastrointestinal: Negative for abdominal pain, diarrhea, nausea and vomiting.   Genitourinary: Negative for frequency and hematuria.   Musculoskeletal: Negative for back pain and myalgias.   Neurological: Negative for syncope, light-headedness and headaches.   Psychiatric/Behavioral: Negative for confusion.     Objective:     Vital Signs (Most Recent):  Temp: 97.9 °F (36.6 °C) (07/15/20 0722)  Pulse: (!) 52 (07/15/20 0800)  Resp: 16 (07/15/20 0722)  BP: (!) 195/85 (07/15/20 0722)  SpO2: (!) 94 % (07/15/20 0800) Vital Signs (24h Range):  Temp:  [96.7 °F (35.9 °C)-98.6 °F (37 °C)] 97.9 °F (36.6 °C)  Pulse:  [52-82] 52  Resp:  [16-20] 16  SpO2:  [93 %-100 %] 94 %  BP: (132-195)/(66-95) 195/85     Weight: 53.9 kg (118 lb 13.3 oz)  Body mass index is 18.61 kg/m².    Intake/Output Summary (Last 24 hours) at 7/15/2020 1107  Last data filed at 7/15/2020 0600  Gross per 24 hour   Intake 60 ml   Output 505 ml   Net -445 ml      Physical Exam  Constitutional:       Appearance: She is well-developed.   HENT:      Head: Normocephalic and atraumatic.   Neck:      Musculoskeletal: Normal range of motion.      Vascular: No JVD.   Cardiovascular:      Rate and Rhythm: Normal rate. Rhythm irregular.      Heart sounds: Normal heart sounds.   Pulmonary:      Effort: Pulmonary effort is normal. No respiratory distress.      Breath sounds: No wheezing.   Abdominal:      General: Bowel sounds are normal. There is no distension.      Palpations: Abdomen is soft.      Tenderness: There is no abdominal tenderness. There is no  guarding.   Musculoskeletal: Normal range of motion.         General: No tenderness.   Skin:     General: Skin is warm and dry.      Capillary Refill: Capillary refill takes less than 2 seconds.   Neurological:      Mental Status: She is alert and oriented to person, place, and time.   Psychiatric:         Behavior: Behavior normal.         Significant Labs:   A1C:   Recent Labs   Lab 07/13/20 2026   HGBA1C 6.0*     ABGs: No results for input(s): PH, PCO2, HCO3, POCSATURATED, BE, TOTALHB, COHB, METHB, O2HB, POCFIO2 in the last 48 hours.  CBC:   Recent Labs   Lab 07/13/20 2026 07/14/20  0539 07/15/20  0825   WBC 3.36* 3.77* 3.38*   HGB 11.1* 11.4* 11.0*   HCT 35.0* 35.9* 36.9*   * 81* 74*     CMP:   Recent Labs   Lab 07/13/20 2026 07/14/20  0539 07/15/20  0825    143 146*   K 3.6 3.2* 3.7    101 104   CO2 30* 30* 35*   * 123* 105   BUN 34* 32* 31*   CREATININE 2.2* 2.0* 2.0*   CALCIUM 9.1 9.3 8.9   PROT 7.2  --   --    ALBUMIN 3.2*  --   --    BILITOT 0.8  --   --    ALKPHOS 43*  --   --    AST 16  --   --    ALT 9*  --   --    ANIONGAP 10 12 7*   EGFRNONAA 19* 22* 22*     Cardiac Markers:   Recent Labs   Lab 07/13/20 2026   BNP 2,423*     Lactic Acid: No results for input(s): LACTATE in the last 48 hours.  Lipase: No results for input(s): LIPASE in the last 48 hours.  TSH:   Recent Labs   Lab 06/11/20  1141   TSH 1.760     Urine Culture: No results for input(s): LABURIN in the last 48 hours.  Urine Studies:   Recent Labs   Lab 07/13/20 2237   COLORU Yellow   APPEARANCEUA Clear   PHUR 6.0   SPECGRAV 1.015   PROTEINUA Negative   GLUCUA Negative   KETONESU Negative   BILIRUBINUA Negative   OCCULTUA Negative   NITRITE Negative   UROBILINOGEN Negative   LEUKOCYTESUR Negative       Significant Imaging: I have reviewed all pertinent imaging results/findings within the past 24 hours.

## 2020-07-15 NOTE — NURSING
IV site and telemetry discontinued without adverse reaction.Discharge paperwork given and will be reviewed by virtual nurse.

## 2020-07-15 NOTE — CONSULTS
[facility]  Cardiology  Consult Note    Patient Name: Daria Carter  MRN: [id number]  Admission Date: 7/13/2020  Hospital Length of Stay: 1 days  Code Status: Full Code   Attending Provider: Bella Cote*   Consulting Provider: Román Galeana NP  Primary Care Physician: Vanessa Atkins MD  Principal Problem:Acute on chronic congestive heart failure    Patient information was obtained from past medical records and ER records.     Inpatient consult to [facility]  Consult performed by: Román Galeana NP  Consult ordered by: Bella Cote MD        Subjective:     Chief Complaint:  SOB     HPI:   HPI retrieved from chart, patient is confused- no family at bedside.   Daria Carter is a 89 yo female with pmh of HTN, T2DM and dementia who presented to ED with complaint of shortness of breath and dyspnea on exertion. Onset 3-4 days ago gradually becoming worse. Denied chest pain, abdominal pain, n/v/d and fever/chills. Labs remarkable for BUN/Cr 34/2.2, BNP 2423, troponin 0.028. CXR shows cardiomegaly with pulmonary edema. -190. EKG  atrial fibrillation (? new onset). She received 80 mg furosemide. Patient previously followed by [facility] cardiology- records are unavailable.     Past Medical History:   Diagnosis Date    Blindness of left eye     Dementia     Diabetes mellitus     Hypertension        Past Surgical History:   Procedure Laterality Date    CATARACT EXTRACTION EXTRACAPSULAR W/ INTRAOCULAR LENS IMPLANTATION Bilateral        Review of patient's allergies indicates:  No Known Allergies    No current facility-administered medications on file prior to encounter.      Current Outpatient Medications on File Prior to Encounter   Medication Sig    ascorbic acid, vitamin C, (VITAMIN C) 1000 MG tablet Take 1,000 mg by mouth once daily.    aspirin (ECOTRIN) 81 MG EC tablet Take 81 mg by mouth once daily.    carvedilol (COREG) 25 MG tablet Take 1 tablet (25 mg  total) by mouth 2 (two) times daily.    furosemide (LASIX) 40 MG tablet Take 1 tablet (40 mg total) by mouth daily as needed (for swelling or weight gain of 5 lbs.).    losartan (COZAAR) 25 MG tablet Take 1 tablet (25 mg total) by mouth with lunch.    pantoprazole (PROTONIX) 40 MG tablet Take 1 tablet (40 mg total) by mouth once daily.     Family History     Problem Relation (Age of Onset)    COPD Father    Cancer Mother    Heart disease Mother    Stroke Father        Tobacco Use    Smoking status: Former Smoker    Smokeless tobacco: Never Used   Substance and Sexual Activity    Alcohol use: No    Drug use: No    Sexual activity: Not Currently     Review of Systems   Unable to perform ROS: dementia     Objective:     Vital Signs (Most Recent):  Temp: 97.9 °F (36.6 °C) (07/15/20 0722)  Pulse: (!) 52 (07/15/20 0800)  Resp: 16 (07/15/20 0722)  BP: (!) 195/85 (07/15/20 0722)  SpO2: (!) 94 % (07/15/20 0800) Vital Signs (24h Range):  Temp:  [96.7 °F (35.9 °C)-98.6 °F (37 °C)] 97.9 °F (36.6 °C)  Pulse:  [52-82] 52  Resp:  [16-20] 16  SpO2:  [93 %-100 %] 94 %  BP: (132-195)/(66-95) 195/85     Weight: 53.9 kg (118 lb 13.3 oz)  Body mass index is 18.61 kg/m².    SpO2: (!) 94 %  O2 Device (Oxygen Therapy): nasal cannula      Intake/Output Summary (Last 24 hours) at 7/15/2020 1011  Last data filed at 7/15/2020 0600  Gross per 24 hour   Intake 60 ml   Output 505 ml   Net -445 ml       Lines/Drains/Airways     Drain            Female External Urinary Catheter 07/13/20 2239 1 day          Peripheral Intravenous Line                 Peripheral IV - Single Lumen 07/13/20 2025 20 G Right Forearm 1 day                Physical Exam   Constitutional: No distress.   HENT:   Head: Atraumatic.   Eyes: Right eye exhibits no discharge. Left eye exhibits no discharge.   Neck: JVD present.   Cardiovascular: Normal rate. An irregularly irregular rhythm present.   Murmur heard.  Pulmonary/Chest: She has decreased breath sounds. She has  rales.   Abdominal: Bowel sounds are normal.   Musculoskeletal:         General: Edema present.   Neurological: She is alert. She is disoriented.   Skin: Skin is warm and dry. She is not diaphoretic.   Psychiatric: Cognition and memory are impaired. She is noncommunicative.       Significant Labs:   BMP:   Recent Labs   Lab 07/13/20 2026 07/14/20  0539 07/15/20  0825   * 123* 105    143 146*   K 3.6 3.2* 3.7    101 104   CO2 30* 30* 35*   BUN 34* 32* 31*   CREATININE 2.2* 2.0* 2.0*   CALCIUM 9.1 9.3 8.9   MG 1.8 1.7 1.8   , CMP   Recent Labs   Lab 07/13/20 2026 07/14/20  0539 07/15/20  0825    143 146*   K 3.6 3.2* 3.7    101 104   CO2 30* 30* 35*   * 123* 105   BUN 34* 32* 31*   CREATININE 2.2* 2.0* 2.0*   CALCIUM 9.1 9.3 8.9   PROT 7.2  --   --    ALBUMIN 3.2*  --   --    BILITOT 0.8  --   --    ALKPHOS 43*  --   --    AST 16  --   --    ALT 9*  --   --    ANIONGAP 10 12 7*   ESTGFRAFRICA 22* 25* 25*   EGFRNONAA 19* 22* 22*   , CBC   Recent Labs   Lab 07/13/20 2026 07/14/20  0539 07/15/20  0825   WBC 3.36* 3.77* 3.38*   HGB 11.1* 11.4* 11.0*   HCT 35.0* 35.9* 36.9*   * 81* 74*   , INR No results for input(s): INR, PROTIME in the last 48 hours., Lipid Panel No results for input(s): CHOL, HDL, LDLCALC, TRIG, CHOLHDL in the last 48 hours. and   Pathology Results  (Last 10 years)    None          Significant Imaging: Echocardiogram:   Transthoracic echo (TTE) complete (Cupid Only):   Results for orders placed or performed during the hospital encounter of 07/13/20   Echo Color Flow Doppler? Yes   Result Value Ref Range    AV mean gradient 4 mmHg    Ao peak gael 1.47 m/s    Ao VTI 33.30 cm    IVRT 125.59 msec    IVS 1.05 0.6 - 1.1 cm    LA size 4.25 cm    Left Atrium Major Axis 5.56 cm    Left Atrium Minor Axis 5.32 cm    LVIDD 4.89 3.5 - 6.0 cm    LVIDS 3.71 2.1 - 4.0 cm    LVOT diameter 2.06 cm    LVOT peak VTI 17.90 cm    PW 1.33 (A) 0.6 - 1.1 cm    MV Peak A Gael 1.04  m/s    E wave decelartion time 327.09 msec    MV Peak E Gael 0.55 m/s    RVDD 2.58 cm    TDI LATERAL 0.03 m/s    TDI SEPTAL 0.03 m/s    LA WIDTH 3.11 cm    Ao root annulus 2.85 cm    AORTIC VALVE CUSP SEPERATION 1.79 cm    PV PEAK VELOCITY 0.51 cm/s    MV stenosis pressure 1/2 time 94.86 ms    LV Diastolic Volume 112.54 mL    LV Systolic Volume 58.39 mL    LVOT peak gael 0.81 m/s    Mr max gael 0.07 m/s    LV LATERAL E/E' RATIO 18.33 m/s    LV SEPTAL E/E' RATIO 18.33 m/s    FS 24 %    LA volume 61.09 cm3    LV mass 223.00 g    Left Ventricle Relative Wall Thickness 0.54 cm    AV valve area 1.79 cm2    AV Velocity Ratio 0.55     AV index (prosthetic) 0.54     MV valve area p 1/2 method 2.32 cm2    E/A ratio 0.53     Mean e' 0.03 m/s    LVOT area 3.3 cm2    LVOT stroke volume 59.63 cm3    AV peak gradient 9 mmHg    E/E' ratio 18.33 m/s    LV Systolic Volume Index 35.1 mL/m2    LV Diastolic Volume Index 67.73 mL/m2    LA Volume Index 36.8 mL/m2    LV Mass Index 134 g/m2    BSA 1.64 m2    Right Atrial Pressure (from IVC) 8 mmHg    AV regurgitation pressure 1/2 time 650 ms    Narrative    · Left ventricular systolic function. The estimated ejection fraction is   35%.  · Global hypokinetic wall motion.  · Grade I (mild) left ventricular diastolic dysfunction consistent with   impaired relaxation.  · Concentric left ventricular hypertrophy.  · Normal right ventricular systolic function.  · Mild-to-moderate mitral regurgitation.  · Mild to moderate pulmonic regurgitation.  · Moderate tricuspid regurgitation.  · Mild aortic regurgitation.  · Mild aortic valve stenosis.  · Aortic valve area is 1.79 cm2; peak velocity is 1.47 m/s; mean gradient   is 4 mmHg.  · Intermediate central venous pressure (8 mmHg).  · Small circumferential pericardial effusion. Effusion is fluid.  · There is a bilateral pleural effusion.        Assessment and Plan:     * Acute on chronic congestive heart failure  Previously followed by cardiology at [facility]-  records are unavailable   LVEF 35% with global hypokinesis- unknown if this is new but given her age, dementia, and comorbidities will defer ischemic work up and treat with conservative management   Agree with Palliative medicine consult for [facility] discussion   Diuresing with Lasix 40 mg IV BID (prescribed 40 mg daily at home)  Continue BB, ARB- if renal function permits     Daily weights, Accurate intake and output     Atrial fibrillation  ? Chronicity   Rate control strategy given her advanced age, dementia, and comorbidities   CHADSVASC 6+  HASBLED 3+  Patient with dementia, very frail, at high risk for falls with injury. Poor candidate for OAC        SERGE (acute kidney injury)  Cr 2.0 baseline ~1.1-1.3  Likely cardiorenal in etiology and would expect improvement with volume removal. Ok to hold ARB and use hydralazine for BP control if Cr is unstable     Essential hypertension  SBP 180s-190s  Agree with PRN hydralazine for SBP >175  Given her age and comorbidities, will allow for higher BP  Continue BB, ARB (if renal function permits)            VTE Risk Mitigation (From admission, onward)         Ordered     IP VTE HIGH RISK PATIENT  Once      07/13/20 2126     Place sequential compression device  Until discontinued      07/13/20 2126                Thank you for your consult. I will follow-up with patient. Please contact us if you have any additional questions.    Román Galeana, KAEL  Cardiology   [facility]

## 2020-07-15 NOTE — PLAN OF CARE
VN note: VN cued into patient's room to review discharge papers. Daughter at bedside. Patient lying in bed while daughter was feeding her. VN educated daughter on new medication. Medication list reviewed as well as changes. Follow-up information given. Bedside Delivery to be completed. VN educated daughter on CHF signs and symptoms and when to seek medical attention. I also educated her on signs of a flare-up and low salt diet. Daughter verbalized understanding and all questions answered. Refer to clinical references for further education given.

## 2020-07-15 NOTE — DISCHARGE INSTRUCTIONS
Heart Failure, Discharge Instructions for (English) View Edit Remove   Heart Failure: Making Changes to Your Diet (English) View Edit Remove   Heart Failure: Tracking Your Weight (English) View Edit Remove   Heart Failure: Warning Signs of a Flare-Up (English) View Edit Remove   Amlodipine tablets (English) View Edit Remove

## 2020-07-21 PROCEDURE — G0180 MD CERTIFICATION HHA PATIENT: HCPCS | Mod: ,,, | Performed by: FAMILY MEDICINE

## 2020-07-21 PROCEDURE — G0180 PR HOME HEALTH MD CERTIFICATION: ICD-10-PCS | Mod: ,,, | Performed by: FAMILY MEDICINE

## 2020-07-28 ENCOUNTER — DOCUMENT SCAN (OUTPATIENT)
Dept: HOME HEALTH SERVICES | Facility: HOSPITAL | Age: 85
End: 2020-07-28
Payer: MEDICARE

## 2020-07-28 ENCOUNTER — TELEPHONE (OUTPATIENT)
Dept: HOME HEALTH SERVICES | Facility: CLINIC | Age: 85
End: 2020-07-28

## 2020-07-28 NOTE — TELEPHONE ENCOUNTER
Multiple attempts to reach pt to confirm visit and set time, Unable to reach, phone not in service. Appt will be cancelled.    No returned call to office.

## 2020-07-29 ENCOUNTER — EXTERNAL HOME HEALTH (OUTPATIENT)
Dept: HOME HEALTH SERVICES | Facility: HOSPITAL | Age: 85
End: 2020-07-29
Payer: MEDICARE

## 2020-08-03 ENCOUNTER — TELEPHONE (OUTPATIENT)
Dept: FAMILY MEDICINE | Facility: CLINIC | Age: 85
End: 2020-08-03

## 2020-08-03 NOTE — TELEPHONE ENCOUNTER
----- Message from Laurence Jones sent at 8/3/2020  4:27 PM CDT -----  Contact: Manuel @ [facility]-[phone]  Type:  Needs Medical Advice    Who Called:  Jerome @ [facility]  Reason for Call; regarding the pt's weight is going back up, pt gained 6 pounds   Would the patient rather a call back or a response via [facility]? Call Back  Best Call Back Number: [phone]

## 2020-08-03 NOTE — TELEPHONE ENCOUNTER
Take 2 of lasix 40 mg tablets in AM for next 3 days.  Continue daily weights.  Make sure she is watching her sodium intake.

## 2020-08-04 ENCOUNTER — TELEPHONE (OUTPATIENT)
Dept: FAMILY MEDICINE | Facility: CLINIC | Age: 85
End: 2020-08-04

## 2020-08-04 DIAGNOSIS — I50.9 ACUTE ON CHRONIC CONGESTIVE HEART FAILURE, UNSPECIFIED HEART FAILURE TYPE: Primary | ICD-10-CM

## 2020-08-04 NOTE — TELEPHONE ENCOUNTER
Spoke with  nurse, Karina. Informed Karina that Dr Atkins has placed lab orders in the system. Karina states that they were supposed to start with the double lasix this morning, but due to patient being unsteady and no signs of fluid retention, they did not double the lasix.   Karina states that she will draw the lab and transport to [facility].

## 2020-08-04 NOTE — TELEPHONE ENCOUNTER
----- Message from Destinee Apple sent at 8/4/2020 11:57 AM CDT -----  Regarding: [facility] Karina [phone]  Contact: [facility] [phone]  Home health nurse is calling to talk to you in regards to patient had a fall last night and has a laceration on left eye and has been getting weaker in the past few days. Patients blood pressure has been 110/46 heart rate 66 and she is on Afib oxygen is 94 lungs are clear and her pupils are equal and reactive and wanted to see if the doctor will do some labs and patient did not go to the ER last night from the fall.

## 2020-08-05 ENCOUNTER — DOCUMENT SCAN (OUTPATIENT)
Dept: HOME HEALTH SERVICES | Facility: HOSPITAL | Age: 85
End: 2020-08-05
Payer: MEDICARE

## 2020-08-05 ENCOUNTER — HOSPITAL ENCOUNTER (INPATIENT)
Facility: HOSPITAL | Age: 85
LOS: 1 days | Discharge: ANOTHER HOSPITAL | DRG: 683 | End: 2020-08-06
Attending: EMERGENCY MEDICINE | Admitting: HOSPITALIST
Payer: MEDICARE

## 2020-08-05 ENCOUNTER — TELEPHONE (OUTPATIENT)
Dept: FAMILY MEDICINE | Facility: CLINIC | Age: 85
End: 2020-08-05

## 2020-08-05 DIAGNOSIS — N17.9 AKI (ACUTE KIDNEY INJURY): ICD-10-CM

## 2020-08-05 DIAGNOSIS — R07.9 CHEST PAIN: ICD-10-CM

## 2020-08-05 DIAGNOSIS — N17.9 ACUTE KIDNEY INJURY: ICD-10-CM

## 2020-08-05 LAB
ALBUMIN SERPL BCP-MCNC: 3 G/DL (ref 3.5–5.2)
ALP SERPL-CCNC: 58 U/L (ref 55–135)
ALT SERPL W/O P-5'-P-CCNC: 7 U/L (ref 10–44)
ANION GAP SERPL CALC-SCNC: 11 MMOL/L (ref 8–16)
AST SERPL-CCNC: 19 U/L (ref 10–40)
BASOPHILS # BLD AUTO: 0.02 K/UL (ref 0–0.2)
BASOPHILS NFR BLD: 0.7 % (ref 0–1.9)
BILIRUB SERPL-MCNC: 0.7 MG/DL (ref 0.1–1)
BILIRUB UR QL STRIP: NEGATIVE
BNP SERPL-MCNC: 501 PG/ML (ref 0–99)
BUN SERPL-MCNC: 66 MG/DL (ref 8–23)
CALCIUM SERPL-MCNC: 9.2 MG/DL (ref 8.7–10.5)
CHLORIDE SERPL-SCNC: 91 MMOL/L (ref 95–110)
CLARITY UR: CLEAR
CO2 SERPL-SCNC: 30 MMOL/L (ref 23–29)
COLOR UR: YELLOW
CREAT SERPL-MCNC: 5.1 MG/DL (ref 0.5–1.4)
DIFFERENTIAL METHOD: ABNORMAL
EOSINOPHIL # BLD AUTO: 0.1 K/UL (ref 0–0.5)
EOSINOPHIL NFR BLD: 4.4 % (ref 0–8)
ERYTHROCYTE [DISTWIDTH] IN BLOOD BY AUTOMATED COUNT: 13.8 % (ref 11.5–14.5)
EST. GFR  (AFRICAN AMERICAN): 8 ML/MIN/1.73 M^2
EST. GFR  (NON AFRICAN AMERICAN): 7 ML/MIN/1.73 M^2
GLUCOSE SERPL-MCNC: 117 MG/DL (ref 70–110)
GLUCOSE UR QL STRIP: NEGATIVE
HCT VFR BLD AUTO: 37.5 % (ref 37–48.5)
HGB BLD-MCNC: 12.2 G/DL (ref 12–16)
HGB UR QL STRIP: NEGATIVE
IMM GRANULOCYTES # BLD AUTO: 0.01 K/UL (ref 0–0.04)
IMM GRANULOCYTES NFR BLD AUTO: 0.3 % (ref 0–0.5)
KETONES UR QL STRIP: NEGATIVE
LEUKOCYTE ESTERASE UR QL STRIP: NEGATIVE
LYMPHOCYTES # BLD AUTO: 0.9 K/UL (ref 1–4.8)
LYMPHOCYTES NFR BLD: 29.5 % (ref 18–48)
MAGNESIUM SERPL-MCNC: 2.1 MG/DL (ref 1.6–2.6)
MCH RBC QN AUTO: 31.4 PG (ref 27–31)
MCHC RBC AUTO-ENTMCNC: 32.5 G/DL (ref 32–36)
MCV RBC AUTO: 97 FL (ref 82–98)
MONOCYTES # BLD AUTO: 0.3 K/UL (ref 0.3–1)
MONOCYTES NFR BLD: 10.5 % (ref 4–15)
NEUTROPHILS # BLD AUTO: 1.6 K/UL (ref 1.8–7.7)
NEUTROPHILS NFR BLD: 54.6 % (ref 38–73)
NITRITE UR QL STRIP: NEGATIVE
NRBC BLD-RTO: 0 /100 WBC
PH UR STRIP: 6 [PH] (ref 5–8)
PHOSPHATE SERPL-MCNC: 4.4 MG/DL (ref 2.7–4.5)
PLATELET # BLD AUTO: 86 K/UL (ref 150–350)
PMV BLD AUTO: 13 FL (ref 9.2–12.9)
POTASSIUM SERPL-SCNC: 4.3 MMOL/L (ref 3.5–5.1)
PROT SERPL-MCNC: 7.3 G/DL (ref 6–8.4)
PROT UR QL STRIP: NEGATIVE
RBC # BLD AUTO: 3.88 M/UL (ref 4–5.4)
SARS-COV-2 RDRP RESP QL NAA+PROBE: NEGATIVE
SODIUM SERPL-SCNC: 132 MMOL/L (ref 136–145)
SP GR UR STRIP: 1.02 (ref 1–1.03)
TROPONIN I SERPL DL<=0.01 NG/ML-MCNC: 0.04 NG/ML (ref 0–0.03)
URN SPEC COLLECT METH UR: NORMAL
UROBILINOGEN UR STRIP-ACNC: NEGATIVE EU/DL
WBC # BLD AUTO: 2.95 K/UL (ref 3.9–12.7)

## 2020-08-05 PROCEDURE — 84484 ASSAY OF TROPONIN QUANT: CPT

## 2020-08-05 PROCEDURE — 83735 ASSAY OF MAGNESIUM: CPT | Mod: 91

## 2020-08-05 PROCEDURE — 85025 COMPLETE CBC W/AUTO DIFF WBC: CPT | Mod: 91

## 2020-08-05 PROCEDURE — 80053 COMPREHEN METABOLIC PANEL: CPT | Mod: 91

## 2020-08-05 PROCEDURE — 83880 ASSAY OF NATRIURETIC PEPTIDE: CPT

## 2020-08-05 PROCEDURE — 63600175 PHARM REV CODE 636 W HCPCS: Performed by: NURSE PRACTITIONER

## 2020-08-05 PROCEDURE — 81003 URINALYSIS AUTO W/O SCOPE: CPT

## 2020-08-05 PROCEDURE — 84100 ASSAY OF PHOSPHORUS: CPT

## 2020-08-05 PROCEDURE — 12000002 HC ACUTE/MED SURGE SEMI-PRIVATE ROOM

## 2020-08-05 PROCEDURE — 99285 EMERGENCY DEPT VISIT HI MDM: CPT | Mod: 25

## 2020-08-05 PROCEDURE — 93005 ELECTROCARDIOGRAM TRACING: CPT

## 2020-08-05 PROCEDURE — G0378 HOSPITAL OBSERVATION PER HR: HCPCS

## 2020-08-05 PROCEDURE — 96360 HYDRATION IV INFUSION INIT: CPT

## 2020-08-05 PROCEDURE — U0002 COVID-19 LAB TEST NON-CDC: HCPCS

## 2020-08-05 PROCEDURE — 51798 US URINE CAPACITY MEASURE: CPT

## 2020-08-05 RX ORDER — SODIUM CHLORIDE, SODIUM LACTATE, POTASSIUM CHLORIDE, CALCIUM CHLORIDE 600; 310; 30; 20 MG/100ML; MG/100ML; MG/100ML; MG/100ML
500 INJECTION, SOLUTION INTRAVENOUS
Status: COMPLETED | OUTPATIENT
Start: 2020-08-05 | End: 2020-08-05

## 2020-08-05 RX ADMIN — SODIUM CHLORIDE, SODIUM LACTATE, POTASSIUM CHLORIDE, AND CALCIUM CHLORIDE 500 ML: .6; .31; .03; .02 INJECTION, SOLUTION INTRAVENOUS at 09:08

## 2020-08-05 NOTE — TELEPHONE ENCOUNTER
----- Message from Lorena Shabazz sent at 8/5/2020  4:13 PM CDT -----  Contact: Mei  Patient daughter would like to get a call back     [phone]

## 2020-08-05 NOTE — PROVIDER PROGRESS NOTES - EMERGENCY DEPT.
Emergency Department TeleTRIAGE Encounter Note      CHIEF COMPLAINT    Chief Complaint   Patient presents with    Abnormal Lab     Pt presents to ED today per her PCP for abdnoral kidney function test.        VITAL SIGNS   Initial Vitals [08/05/20 1759]   BP Pulse Resp Temp SpO2   (!) 105/50 60 19 97 °F (36.1 °C) 97 %      MAP       --            ALLERGIES    Review of patient's allergies indicates:  No Known Allergies    PROVIDER TRIAGE NOTE  Patient is a 89-year-old female presenting for evaluation of abnormal blood work.  Patient had blood work done today by her PCP, was called and notified that her creatinine was 4.6.  Labs, fluids ordered.  Patient is with her daughter who provides history.      ORDERS  Labs Reviewed   CBC W/ AUTO DIFFERENTIAL   COMPREHENSIVE METABOLIC PANEL   MAGNESIUM   URINALYSIS, REFLEX TO URINE CULTURE       ED Orders (720h ago, onward)    Start Ordered     Status Ordering Provider    08/05/20 1815 08/05/20 1810  sodium chloride 0.9% bolus 1,000 mL  ED 1 Time      Ordered IRASEMA SPENCER    08/05/20 1811 08/05/20 1810  CBC auto differential  STAT  Collect    Ordered IRASEMA SPENCER    08/05/20 1811 08/05/20 1810  Comprehensive metabolic panel  STAT  Collect    Ordered IRASEMA SPENCER    08/05/20 1811 08/05/20 1810  Magnesium  STAT  Collect    Ordered IRASEMA SPENCER    08/05/20 1811 08/05/20 1810  EKG 12-lead  Once      Ordered IRASEMA SPENCER    08/05/20 1811 08/05/20 1810  Saline lock IV  Once      Ordered IRASEMA SPENCER    08/05/20 1811 08/05/20 1810  Urinalysis, Reflex to Urine Culture Urine, Clean Catch  STAT      Ordered IRASEMA SPENCER            Virtual Visit Note: The provider triage portion of this emergency department evaluation and documentation was performed via [employer], a HIPAA-compliant telemedicine application, in concert with a tele-presenter in the room. A face to face patient evaluation with one of my colleagues will occur once the patient is placed in an emergency  department room.      DISCLAIMER: This note was prepared with [employer] voice recognition transcription software. Garbled syntax, mangled pronouns, and other bizarre constructions may be attributed to that software system.

## 2020-08-06 VITALS
HEIGHT: 67 IN | SYSTOLIC BLOOD PRESSURE: 131 MMHG | HEART RATE: 67 BPM | RESPIRATION RATE: 23 BRPM | WEIGHT: 118 LBS | TEMPERATURE: 98 F | DIASTOLIC BLOOD PRESSURE: 64 MMHG | BODY MASS INDEX: 18.52 KG/M2 | OXYGEN SATURATION: 96 %

## 2020-08-06 PROBLEM — I48.0 PAROXYSMAL ATRIAL FIBRILLATION: Status: ACTIVE | Noted: 2020-07-14

## 2020-08-06 PROBLEM — N18.30 ACUTE RENAL FAILURE SUPERIMPOSED ON STAGE 3 CHRONIC KIDNEY DISEASE: Status: ACTIVE | Noted: 2017-12-30

## 2020-08-06 PROBLEM — F03.90 DEMENTIA WITHOUT BEHAVIORAL DISTURBANCE: Status: ACTIVE | Noted: 2020-08-06

## 2020-08-06 LAB
ANION GAP SERPL CALC-SCNC: 7 MMOL/L (ref 8–16)
BASOPHILS # BLD AUTO: 0.02 K/UL (ref 0–0.2)
BASOPHILS NFR BLD: 0.8 % (ref 0–1.9)
BUN SERPL-MCNC: 66 MG/DL (ref 8–23)
CALCIUM SERPL-MCNC: 9 MG/DL (ref 8.7–10.5)
CHLORIDE SERPL-SCNC: 92 MMOL/L (ref 95–110)
CO2 SERPL-SCNC: 33 MMOL/L (ref 23–29)
CREAT SERPL-MCNC: 4.9 MG/DL (ref 0.5–1.4)
DIFFERENTIAL METHOD: ABNORMAL
EOSINOPHIL # BLD AUTO: 0.2 K/UL (ref 0–0.5)
EOSINOPHIL NFR BLD: 6 % (ref 0–8)
ERYTHROCYTE [DISTWIDTH] IN BLOOD BY AUTOMATED COUNT: 13.7 % (ref 11.5–14.5)
EST. GFR  (AFRICAN AMERICAN): 8 ML/MIN/1.73 M^2
EST. GFR  (NON AFRICAN AMERICAN): 7 ML/MIN/1.73 M^2
GLUCOSE SERPL-MCNC: 108 MG/DL (ref 70–110)
HCT VFR BLD AUTO: 35.6 % (ref 37–48.5)
HGB BLD-MCNC: 11.5 G/DL (ref 12–16)
IMM GRANULOCYTES # BLD AUTO: 0 K/UL (ref 0–0.04)
IMM GRANULOCYTES NFR BLD AUTO: 0 % (ref 0–0.5)
LYMPHOCYTES # BLD AUTO: 0.8 K/UL (ref 1–4.8)
LYMPHOCYTES NFR BLD: 32.1 % (ref 18–48)
MCH RBC QN AUTO: 31.3 PG (ref 27–31)
MCHC RBC AUTO-ENTMCNC: 32.3 G/DL (ref 32–36)
MCV RBC AUTO: 97 FL (ref 82–98)
MONOCYTES # BLD AUTO: 0.2 K/UL (ref 0.3–1)
MONOCYTES NFR BLD: 8 % (ref 4–15)
NEUTROPHILS # BLD AUTO: 1.3 K/UL (ref 1.8–7.7)
NEUTROPHILS NFR BLD: 53.1 % (ref 38–73)
NRBC BLD-RTO: 0 /100 WBC
OSMOLALITY UR: 272 MOSM/KG (ref 50–1200)
PLATELET # BLD AUTO: 84 K/UL (ref 150–350)
PMV BLD AUTO: 12.3 FL (ref 9.2–12.9)
POCT GLUCOSE: 106 MG/DL (ref 70–110)
POTASSIUM SERPL-SCNC: 3.9 MMOL/L (ref 3.5–5.1)
RBC # BLD AUTO: 3.67 M/UL (ref 4–5.4)
SODIUM SERPL-SCNC: 132 MMOL/L (ref 136–145)
SODIUM UR-SCNC: 37 MMOL/L (ref 20–250)
WBC # BLD AUTO: 2.49 K/UL (ref 3.9–12.7)

## 2020-08-06 PROCEDURE — 85025 COMPLETE CBC W/AUTO DIFF WBC: CPT

## 2020-08-06 PROCEDURE — 84300 ASSAY OF URINE SODIUM: CPT

## 2020-08-06 PROCEDURE — 12000002 HC ACUTE/MED SURGE SEMI-PRIVATE ROOM

## 2020-08-06 PROCEDURE — 83935 ASSAY OF URINE OSMOLALITY: CPT

## 2020-08-06 PROCEDURE — 25000003 PHARM REV CODE 250: Performed by: NURSE PRACTITIONER

## 2020-08-06 PROCEDURE — 80048 BASIC METABOLIC PNL TOTAL CA: CPT

## 2020-08-06 PROCEDURE — 25000003 PHARM REV CODE 250: Performed by: HOSPITALIST

## 2020-08-06 PROCEDURE — 63600175 PHARM REV CODE 636 W HCPCS: Performed by: INTERNAL MEDICINE

## 2020-08-06 RX ORDER — DEXTROSE MONOHYDRATE 50 MG/ML
INJECTION, SOLUTION INTRAVENOUS CONTINUOUS
Status: DISCONTINUED | OUTPATIENT
Start: 2020-08-06 | End: 2020-08-06

## 2020-08-06 RX ORDER — ONDANSETRON 2 MG/ML
4 INJECTION INTRAMUSCULAR; INTRAVENOUS EVERY 8 HOURS PRN
Status: DISCONTINUED | OUTPATIENT
Start: 2020-08-06 | End: 2020-08-06 | Stop reason: HOSPADM

## 2020-08-06 RX ORDER — SODIUM CHLORIDE, SODIUM LACTATE, POTASSIUM CHLORIDE, CALCIUM CHLORIDE 600; 310; 30; 20 MG/100ML; MG/100ML; MG/100ML; MG/100ML
INJECTION, SOLUTION INTRAVENOUS ONCE
Status: DISCONTINUED | OUTPATIENT
Start: 2020-08-06 | End: 2020-08-06

## 2020-08-06 RX ORDER — CARVEDILOL 25 MG/1
25 TABLET ORAL 2 TIMES DAILY
Status: DISCONTINUED | OUTPATIENT
Start: 2020-08-06 | End: 2020-08-06

## 2020-08-06 RX ORDER — ACETAMINOPHEN 325 MG/1
650 TABLET ORAL EVERY 4 HOURS PRN
Status: DISCONTINUED | OUTPATIENT
Start: 2020-08-06 | End: 2020-08-06 | Stop reason: HOSPADM

## 2020-08-06 RX ORDER — SODIUM CHLORIDE 0.9 % (FLUSH) 0.9 %
10 SYRINGE (ML) INJECTION
Status: DISCONTINUED | OUTPATIENT
Start: 2020-08-06 | End: 2020-08-06 | Stop reason: HOSPADM

## 2020-08-06 RX ORDER — ASCORBIC ACID 500 MG
1000 TABLET ORAL DAILY
Status: DISCONTINUED | OUTPATIENT
Start: 2020-08-06 | End: 2020-08-06 | Stop reason: HOSPADM

## 2020-08-06 RX ORDER — AMLODIPINE BESYLATE 5 MG/1
10 TABLET ORAL DAILY
Status: DISCONTINUED | OUTPATIENT
Start: 2020-08-06 | End: 2020-08-06

## 2020-08-06 RX ORDER — ASPIRIN 81 MG/1
81 TABLET ORAL DAILY
Status: DISCONTINUED | OUTPATIENT
Start: 2020-08-06 | End: 2020-08-06 | Stop reason: HOSPADM

## 2020-08-06 RX ORDER — SODIUM CHLORIDE 9 MG/ML
INJECTION, SOLUTION INTRAVENOUS ONCE
Status: DISCONTINUED | OUTPATIENT
Start: 2020-08-06 | End: 2020-08-06

## 2020-08-06 RX ORDER — SODIUM CHLORIDE, SODIUM LACTATE, POTASSIUM CHLORIDE, CALCIUM CHLORIDE 600; 310; 30; 20 MG/100ML; MG/100ML; MG/100ML; MG/100ML
INJECTION, SOLUTION INTRAVENOUS CONTINUOUS
Status: DISCONTINUED | OUTPATIENT
Start: 2020-08-06 | End: 2020-08-06

## 2020-08-06 RX ORDER — PANTOPRAZOLE SODIUM 40 MG/1
40 TABLET, DELAYED RELEASE ORAL DAILY
Status: DISCONTINUED | OUTPATIENT
Start: 2020-08-06 | End: 2020-08-06 | Stop reason: HOSPADM

## 2020-08-06 RX ADMIN — DEXTROSE: 5 SOLUTION INTRAVENOUS at 09:08

## 2020-08-06 RX ADMIN — SODIUM CHLORIDE, SODIUM LACTATE, POTASSIUM CHLORIDE, AND CALCIUM CHLORIDE: .6; .31; .03; .02 INJECTION, SOLUTION INTRAVENOUS at 02:08

## 2020-08-06 RX ADMIN — ASPIRIN 81 MG: 81 TABLET, COATED ORAL at 09:08

## 2020-08-06 RX ADMIN — PANTOPRAZOLE SODIUM 40 MG: 40 TABLET, DELAYED RELEASE ORAL at 09:08

## 2020-08-06 RX ADMIN — OXYCODONE HYDROCHLORIDE AND ACETAMINOPHEN 1000 MG: 500 TABLET ORAL at 09:08

## 2020-08-06 NOTE — DISCHARGE SUMMARY
[facility] Medicine  Transfer Summary      Patient Name: Daria Carter  MRN: [id number]  Admission Date: 8/5/2020  Hospital Length of Stay: 0 days  Transfer Date and Time: No discharge date for patient encounter.  Attending Physician: Gómez Hernandez DO   Transferring Provider: Gómez Hernandez DO  Primary Care Provider: Vanessa Atkins MD      HPI:   Daria Carter is a 89 yo female with pmh of HTN, atrial fibrillation, combined HF (EF 35%), T2DM and dementia who was referred to ED by primary care for abnormal labs. Pt/family provides limited history. Per chart review patient admitted 7/13-7/15 for management of heart failure exacerbation. Daughter reports pt/family has been monitoring her fluid intake since discharge. She has had significant decrease in po intake, however she was noted to have 6 lb weight gain by home health nurse on 8/3. Pt advised to double daily dose on furosemide beginning 8/4. Patient noted to have progressive generalized weakness by home health. Routine labs remarkable for BUN/Cr 69/4.6 (basleine Cr 2), , troponin 0.038. CXR shows findings indicating cardiomegaly with pulmonary edema bilaterally. UA negative. She received         * No surgery found *      Hospital Course:   8/6 the pt was evaluated, she is stable, daughter at bedside, nephrology also evaluated the pt  She seems dry, will provide gentle hydration     Consults:   Consults (From admission, onward)        Status Ordering Provider     Inpatient consult to [facility] (Cindy Sheikh, [place])  Once     Provider:  Leann Heard MD    Completed KYLEE FALLON          Chronic congestive heart failure  LVEF 35% with global hypokinesis echo 7/2020   No evidence of volume overload on exam   Hold furosemide for now 2/2 SERGE   Monitor tele   Low Na diet         Final Active Diagnoses:    Diagnosis Date Noted POA    PRINCIPAL PROBLEM:  Acute renal failure superimposed on stage 3  chronic kidney disease [N17.9, N18.3] 12/30/2017 Yes    Type 2 diabetes mellitus [E11.9] 07/14/2020 Yes    Paroxysmal atrial fibrillation [I48.0] 07/14/2020 Yes    Chronic congestive heart failure [I50.9] 07/13/2020 Yes    Essential hypertension [I10] 12/30/2017 Yes      Problems Resolved During this Admission:       Discharged Condition: stable    Disposition:     Follow Up:    Patient Instructions:   No discharge procedures on file.    Significant Diagnostic Studies: Labs:   BMP:   Recent Labs   Lab 08/05/20  1302 08/05/20 2042 08/06/20  0844   GLU 99 117* 108   * 132* 132*   K 4.4 4.3 3.9   CL 93* 91* 92*   CO2 31* 30* 33*   BUN 69* 66* 66*   CREATININE 4.63* 5.1* 4.9*   CALCIUM 9.0 9.2 9.0   MG 2.1 2.1  --    , CMP   Recent Labs   Lab 08/05/20  1302 08/05/20 2042 08/06/20  0844   * 132* 132*   K 4.4 4.3 3.9   CL 93* 91* 92*   CO2 31* 30* 33*   GLU 99 117* 108   BUN 69* 66* 66*   CREATININE 4.63* 5.1* 4.9*   CALCIUM 9.0 9.2 9.0   PROT 6.6 7.3  --    ALBUMIN 3.1* 3.0*  --    BILITOT 0.7 0.7  --    ALKPHOS 51 58  --    AST 30 19  --    ALT 8* 7*  --    ANIONGAP 10 11 7*   ESTGFRAFRICA 9.0* 8* 8*   EGFRNONAA 7.8* 7* 7*    and CBC   Recent Labs   Lab 08/05/20  1302 08/05/20 2042 08/06/20  0844   WBC 3.21* 2.95* 2.49*   HGB 11.5* 12.2 11.5*   HCT 35.7* 37.5 35.6*   PLT 89* 86* 84*       Pending Diagnostic Studies:     Procedure Component Value Units Date/Time    US Retroperitoneal Complete (Kidney and [[id number]] Resulted: 08/06/20 1606    Order Status: Sent Lab Status: In process Updated: 08/06/20 1606         Medications:  Reconciled Home Medications:      Medication List      ASK your doctor about these medications    amLODIPine 10 MG tablet  Commonly known as: NORVASC  Take 1 tablet (10 mg total) by mouth once daily.     aspirin 81 MG EC tablet  Commonly known as: ECOTRIN  Take 81 mg by mouth once daily.     carvediloL 25 MG tablet  Commonly known as: COREG  Take 1 tablet (25 mg total) by mouth  2 (two) times daily.     furosemide 40 MG tablet  Commonly known as: LASIX  Take 1 tablet (40 mg total) by mouth once daily.     losartan 25 MG tablet  Commonly known as: COZAAR  Take 1 tablet (25 mg total) by mouth with lunch.     pantoprazole 40 MG tablet  Commonly known as: PROTONIX  Take 1 tablet (40 mg total) by mouth once daily.     VITAMIN C 1000 MG tablet  Generic drug: ascorbic acid (vitamin C)  Take 1,000 mg by mouth once daily.            Indwelling Lines/Drains at time of discharge:   Lines/Drains/Airways     None                 Time spent on the discharge of patient: 65 minutes  Patient was seen and examined on the date of discharge and determined to be suitable for transfer to [facility].         Gómez Hernandez DO  Department of Hospital Medicine  [facility]

## 2020-08-06 NOTE — ASSESSMENT & PLAN NOTE
Rate controlled with BB- hold carvedilol for now to avoid hypotension in setting of SERGE   CHADSVASC 6+  Patient with dementia, very frail, at high risk for falls with injury. Poor candidate for OAC

## 2020-08-06 NOTE — HOSPITAL COURSE
8/6 the pt was evaluated, she is stable, daughter at bedside, nephrology also evaluated the pt  She seems dry, will provide gentle hydration

## 2020-08-06 NOTE — ASSESSMENT & PLAN NOTE
No evidence of volume overload on exam   Hold furosemide for now 2/2 SERGE   Monitor tele   Low Na diet

## 2020-08-06 NOTE — ASSESSMENT & PLAN NOTE
LVEF 35% with global hypokinesis echo 7/2020   No evidence of volume overload on exam   Hold furosemide for now 2/2 SERGE   Monitor tele   Low Na diet

## 2020-08-06 NOTE — H&P
[facility] Medicine  History & Physical    Patient Name: Daria Carter  MRN: [id number]  Admission Date: 8/5/2020  Attending Physician: No att. providers found   Primary Care Provider: Vanessa Atkins MD         Patient information was obtained from relative(s), past medical records and ER records.     Subjective:     Principal Problem:SERGE (acute kidney injury)    Chief Complaint:   Chief Complaint   Patient presents with    Abnormal Lab     Pt presents to ED today per her PCP for abdnoral kidney function test.         HPI: Daria Carter is a 89 yo female with pmh of HTN, atrial fibrillation, combined HF (EF 35%), T2DM and dementia who was referred to ED by primary care for abnormal labs. Pt/family provides limited history. Per chart review patient admitted 7/13-7/15 for management of heart failure exacerbation. Daughter reports pt/family has been monitoring her fluid intake since discharge. She has had significant decrease in po intake, however she was noted to have 6 lb weight gain by home health nurse on 8/3. Pt advised to double daily dose on furosemide beginning 8/4. Patient noted to have progressive generalized weakness by home health. Routine labs remarkable for BUN/Cr 69/4.6 (basleine Cr 2), , troponin 0.038. CXR shows findings indicating cardiomegaly with pulmonary edema bilaterally. UA negative. She received         Past Medical History:   Diagnosis Date    Blindness of left eye     Dementia     Diabetes mellitus     Hypertension        Past Surgical History:   Procedure Laterality Date    CATARACT EXTRACTION EXTRACAPSULAR W/ INTRAOCULAR LENS IMPLANTATION Bilateral        Review of patient's allergies indicates:  No Known Allergies    No current facility-administered medications on file prior to encounter.      Current Outpatient Medications on File Prior to Encounter   Medication Sig    amLODIPine (NORVASC) 10 MG tablet Take 1 tablet (10 mg total) by mouth once daily.     ascorbic acid, vitamin C, (VITAMIN C) 1000 MG tablet Take 1,000 mg by mouth once daily.    aspirin (ECOTRIN) 81 MG EC tablet Take 81 mg by mouth once daily.    carvedilol (COREG) 25 MG tablet Take 1 tablet (25 mg total) by mouth 2 (two) times daily.    furosemide (LASIX) 40 MG tablet Take 1 tablet (40 mg total) by mouth once daily.    losartan (COZAAR) 25 MG tablet Take 1 tablet (25 mg total) by mouth with lunch.    pantoprazole (PROTONIX) 40 MG tablet Take 1 tablet (40 mg total) by mouth once daily.     Family History     Problem Relation (Age of Onset)    COPD Father    Cancer Mother    Heart disease Mother    Stroke Father        Tobacco Use    Smoking status: Former Smoker    Smokeless tobacco: Never Used   Substance and Sexual Activity    Alcohol use: No    Drug use: No    Sexual activity: Not Currently     Review of Systems   Constitutional: Positive for fatigue. Negative for chills, diaphoresis and fever.   Eyes: Negative for photophobia.   Respiratory: Negative for cough, chest tightness, shortness of breath and wheezing.    Cardiovascular: Negative for chest pain, palpitations and leg swelling.   Gastrointestinal: Negative for abdominal pain, diarrhea, nausea and vomiting.   Genitourinary: Negative for dysuria, flank pain, frequency and hematuria.   Musculoskeletal: Negative for back pain and myalgias.   Neurological: Positive for weakness. Negative for dizziness, syncope, light-headedness and headaches.   Psychiatric/Behavioral: Negative for confusion.     Objective:     Vital Signs (Most Recent):  Temp: 97.6 °F (36.4 °C) (08/06/20 0422)  Pulse: 75 (08/06/20 0422)  Resp: 19 (08/06/20 0422)  BP: 138/67 (08/06/20 0422)  SpO2: 97 % (08/06/20 0422) Vital Signs (24h Range):  Temp:  [97 °F (36.1 °C)-98.6 °F (37 °C)] 97.6 °F (36.4 °C)  Pulse:  [60-78] 75  Resp:  [19] 19  SpO2:  [97 %-98 %] 97 %  BP: (105-138)/(50-67) 138/67     Weight: 53.5 kg (118 lb)  Body mass index is 18.48 kg/m².    Physical  Exam  Constitutional:       Appearance: She is well-developed.   HENT:      Head: Normocephalic and atraumatic.   Eyes:      Conjunctiva/sclera: Conjunctivae normal.      Pupils: Pupils are equal, round, and reactive to light.   Neck:      Musculoskeletal: Normal range of motion.      Vascular: No JVD.   Cardiovascular:      Rate and Rhythm: Normal rate and regular rhythm.      Heart sounds: Normal heart sounds.      Comments: No LE edema   Pulmonary:      Effort: Pulmonary effort is normal. No respiratory distress.      Breath sounds: No wheezing.   Abdominal:      General: Bowel sounds are normal. There is no distension.      Palpations: Abdomen is soft.      Tenderness: There is no abdominal tenderness. There is no guarding.   Musculoskeletal: Normal range of motion.         General: No tenderness.   Skin:     General: Skin is warm and dry.      Capillary Refill: Capillary refill takes less than 2 seconds.   Neurological:      Mental Status: She is alert and oriented to person, place, and time.   Psychiatric:         Behavior: Behavior normal.           CRANIAL NERVES     CN III, IV, VI   Pupils are equal, round, and reactive to light.       Significant Labs:   BMP:   Recent Labs   Lab 08/05/20 2042   *   *   K 4.3   CL 91*   CO2 30*   BUN 66*   CREATININE 5.1*   CALCIUM 9.2   MG 2.1     CBC:   Recent Labs   Lab 08/05/20  1302 08/05/20  2042   WBC 3.21* 2.95*   HGB 11.5* 12.2   HCT 35.7* 37.5   PLT 89* 86*     Urine Studies:   Recent Labs   Lab 08/05/20 2120   COLORU Yellow   APPEARANCEUA Clear   PHUR 6.0   SPECGRAV 1.020   PROTEINUA Negative   GLUCUA Negative   KETONESU Negative   BILIRUBINUA Negative   OCCULTUA Negative   NITRITE Negative   UROBILINOGEN Negative   LEUKOCYTESUR Negative       Significant Imaging: I have reviewed all pertinent imaging results/findings within the past 24 hours.    Assessment/Plan:     * SERGE (acute kidney injury)  BUN/Cr 66/5.1 on admit (Cr 2.0 on discharge 7/15)    Suspect prerenal as family reports poor appetite and po intake   UA negative, check renal US   Renal dose meds   Strict intake and output   Avoid nephrotoxic meds- Hold ARB and diuretic   Consult nephrology        Paroxysmal atrial fibrillation  Rate controlled with BB- hold carvedilol for now to avoid hypotension in setting of SERGE   CHADSVASC 6+  Patient with dementia, very frail, at high risk for falls with injury. Poor candidate for OAC    Type 2 diabetes mellitus  A1C 6.0   Diet controlled   Low dose SSI, accucheck AC&HS, Diabetic diet       Chronic congestive heart failure  LVEF 35% with global hypokinesis echo 7/2020   No evidence of volume overload on exam   Hold furosemide for now 2/2 SERGE   Monitor tele   Low Na diet       Essential hypertension  -138   Taking carvedilol, amlodipine and losartan- hold all for now to avoid hypotension in setting of SERGE       VTE Risk Mitigation (From admission, onward)         Ordered     IP VTE HIGH RISK PATIENT  Once      08/06/20 0447     Place sequential compression device  Until discontinued      08/06/20 0447                   Megan Smith NP  Department of Hospital Medicine   [facility]

## 2020-08-06 NOTE — NURSING
Pt transferred to [facility] bed 2127 per [facility].

## 2020-08-06 NOTE — HPI
Daria Carter is a 89 yo female with pmh of HTN, atrial fibrillation, combined HF (EF 35%), T2DM and dementia who was referred to ED by primary care for abnormal labs. Pt/family provides limited history. Per chart review patient admitted 7/13-7/15 for management of heart failure exacerbation. Daughter reports pt/family has been monitoring her fluid intake since discharge. She has had significant decrease in po intake, however she was noted to have 6 lb weight gain by home health nurse on 8/3. Pt advised to double daily dose on furosemide beginning 8/4. Patient noted to have progressive generalized weakness by home health. Routine labs remarkable for BUN/Cr 69/4.6 (basleine Cr 2), , troponin 0.038. CXR shows findings indicating cardiomegaly with pulmonary edema bilaterally. UA negative. She received

## 2020-08-06 NOTE — PLAN OF CARE
No acute events during shift. No needs or c/o reported. Bladder scan showed 132ml and patient voided 150 per bed pan. VSS. Tele afib. Daughter at bedside updated on plan to transfer to [place]. Daughter Mei notified as well. Kidney U/S done. Will continue to monitor.

## 2020-08-06 NOTE — ED TRIAGE NOTES
Pt presents to ED with daughter for abnormal lab test (kidney function). Pt daughter states her mothers PCP told her to take her mom to the ED because of kidney function test. Denies any pain or distress at this time. Will cont to monitor.

## 2020-08-06 NOTE — CONSULTS
NEPHROLOGY CONSULT NOTE    HPI & INTERVAL HISTORY:    Past Medical History:   Diagnosis Date    Blindness of left eye     Dementia     Diabetes mellitus     Hypertension       Past Surgical History:   Procedure Laterality Date    CATARACT EXTRACTION EXTRACAPSULAR W/ INTRAOCULAR LENS IMPLANTATION Bilateral       Review of patient's allergies indicates:  No Known Allergies   (Not in a hospital admission)      Social History     Socioeconomic History    Marital status: Single     Spouse name: Not on file    Number of children: Not on file    Years of education: Not on file    Highest education level: Not on file   Occupational History    Not on file   Social Needs    Financial resource strain: Not on file    Food insecurity     Worry: Not on file     Inability: Not on file    Transportation needs     Medical: Not on file     Non-medical: Not on file   Tobacco Use    Smoking status: Former Smoker    Smokeless tobacco: Never Used   Substance and Sexual Activity    Alcohol use: No    Drug use: No    Sexual activity: Not Currently   Lifestyle    Physical activity     Days per week: Not on file     Minutes per session: Not on file    Stress: Not on file   Relationships    Social connections     Talks on phone: Not on file     Gets together: Not on file     Attends Denominational service: Not on file     Active member of club or organization: Not on file     Attends meetings of clubs or organizations: Not on file     Relationship status: Not on file   Other Topics Concern    Not on file   Social History Narrative    Not on file        MEDS   ascorbic acid (vitamin C)  1,000 mg Oral Daily    aspirin  81 mg Oral Daily    pantoprazole  40 mg Oral Daily        ROS:          CONTINOUS INFUSIONS:      Intake/Output Summary (Last 24 hours) at 8/6/2020 1201  Last data filed at 8/6/2020 0543  Gross per 24 hour   Intake --   Output 150 ml   Net -150 ml        HEMODYNAMICS:    Temp:  [97 °F (36.1 °C)-98.6 °F (37  °C)] 97.9 °F (36.6 °C)  Pulse:  [56-78] 56  Resp:  [11-20] 13  SpO2:  [96 %-100 %] 100 %  BP: (105-173)/(50-74) 116/56   NAD  Poor appetite  Decreased intake  Weakness  Weight loss  No SOB  No cough  No CP  No fever  No chills  No nausea  No vomiting  No diarrhea  No dysuria  Cards: pulse 60  Pul: diminished breath sounds  Abdomen soft   Ext: no edema   Skin: dry   LABS   Lab Results   Component Value Date    WBC 2.49 (L) 08/06/2020    HGB 11.5 (L) 08/06/2020    HCT 35.6 (L) 08/06/2020    MCV 97 08/06/2020    PLT 84 (L) 08/06/2020        Recent Labs   Lab 08/05/20 2042 08/06/20  0844   * 108   CALCIUM 9.2 9.0   ALBUMIN 3.0*  --    PROT 7.3  --    * 132*   K 4.3 3.9   CO2 30* 33*   CL 91* 92*   BUN 66* 66*   CREATININE 5.1* 4.9*   ALKPHOS 58  --    ALT 7*  --    AST 19  --    BILITOT 0.7  --       Lab Results   Component Value Date    CALCIUM 9.0 08/06/2020    PHOS 4.4 08/05/2020      No results found for: IRON, TIBC, FERRITIN     ABG  No results for input(s): PH, PO2, PCO2, HCO3, BE in the last 168 hours.      IMAGING:  CXR    ASSESSMENT / PLAN  SERGE/CKD 4  Creatinine 2 on 7/15  Creatinine 4.9, BUN 66  UA protein negative  Hyponatremia 132  Metabolic alkalosis  Metabolic bone disease  Anemia Hb 11.5   CXR-  Findings indicating cardiomegaly with pulmonary edema bilaterally.  No evidence of a pleural effusion.    Poor nutrition  Albumin 3  Echo 7/14/20  · Left ventricular systolic function. The estimated ejection fraction is 35%.  · Global hypokinetic wall motion.  · Grade I (mild) left ventricular diastolic dysfunction consistent with impaired relaxation.  · Concentric left ventricular hypertrophy.  · Normal right ventricular systolic function.  · Mild-to-moderate mitral regurgitation.  · Mild to moderate pulmonic regurgitation.  · Moderate tricuspid regurgitation.  · Mild aortic regurgitation.  · Mild aortic valve stenosis.  · Aortic valve area is 1.79 cm2; peak velocity is 1.47 m/s; mean  gradient is 4 mmHg.  · Intermediate central venous pressure (8 mmHg).  · Small circumferential pericardial effusion. Effusion is fluid.  · There is a bilateral pleural effusion.  · /56  · Weight daily  · I and O  · Avoid nephrotoxic agents, hypotension.  · Will follow up.

## 2020-08-06 NOTE — ED PROVIDER NOTES
"Encounter Date: 8/5/2020       History     Chief Complaint   Patient presents with    Abnormal Lab     Pt presents to ED today per her PCP for abdnoral kidney function test.      89yo female with pmhx of DM, HTN, and diabetes presents to the ED at the advice of her physician for "abnormal labs."  The patient reports that she has not had a good appetite for about three years.  However, over the past few months it has significantly decreased.   The patient was recently admitted (DC'd 7/15) for CHF and has been monitoring her fluid intake and fluid as directed.  Over the past week she has not been drinking much but reports she is taking her lasix as prescribed. She has even had to increase her lasix dose recently due to weight gain.   has been feeling generally weak so her PCP ordered labs which were drawn today.  The patient was then advised to report to the ED due to elevated creatinine.  Pt denies CP, SOB, abd pain, n/v/d.  She is still making urine.  No other complaints at this time.     The history is provided by the patient, medical records and a relative.     Review of patient's allergies indicates:  No Known Allergies  Past Medical History:   Diagnosis Date    Blindness of left eye     Dementia     Diabetes mellitus     Hypertension      Past Surgical History:   Procedure Laterality Date    CATARACT EXTRACTION EXTRACAPSULAR W/ INTRAOCULAR LENS IMPLANTATION Bilateral      Family History   Problem Relation Age of Onset    Cancer Mother     Heart disease Mother     Stroke Father     COPD Father      Social History     Tobacco Use    Smoking status: Former Smoker    Smokeless tobacco: Never Used   Substance Use Topics    Alcohol use: No    Drug use: No     Review of Systems   Constitutional: Positive for appetite change and fatigue. Negative for fever.   HENT: Negative for congestion.    Respiratory: Negative for cough and shortness of breath.    Cardiovascular: Negative for chest pain. "   Gastrointestinal: Negative for abdominal pain, diarrhea and vomiting.   Genitourinary: Negative for decreased urine volume, difficulty urinating and hematuria.   Musculoskeletal: Negative for back pain.   Skin: Negative for rash.   Allergic/Immunologic: Negative for immunocompromised state.   Neurological: Negative for headaches.   Psychiatric/Behavioral: Negative for confusion.       Physical Exam     Initial Vitals [08/05/20 1759]   BP Pulse Resp Temp SpO2   (!) 105/50 60 19 97 °F (36.1 °C) 97 %      MAP       --         Physical Exam    Nursing note and vitals reviewed.  Constitutional: Vital signs are normal. She appears well-developed and well-nourished. She is active and cooperative.  Non-toxic appearance. She does not have a sickly appearance. She does not appear ill.   HENT:   Head: Normocephalic and atraumatic.   Mouth/Throat: Mucous membranes are dry.   Eyes: Conjunctivae and EOM are normal.   Neck: Normal range of motion. Neck supple. No JVD present.   Cardiovascular: Normal rate and regular rhythm.   Pulmonary/Chest: Effort normal. She has no decreased breath sounds. She has rhonchi (bases bilaterally).   Abdominal: Soft. Normal appearance and bowel sounds are normal.   Musculoskeletal:      Comments: No LE edema.    Neurological: She is alert and oriented to person, place, and time. She has normal strength. GCS eye subscore is 4. GCS verbal subscore is 5. GCS motor subscore is 6.   Skin: Skin is warm, dry and intact. No rash noted.   Psychiatric: She has a normal mood and affect. Her speech is normal and behavior is normal. Judgment and thought content normal. Cognition and memory are normal.         ED Course   Procedures  Labs Reviewed   CBC W/ AUTO DIFFERENTIAL - Abnormal; Notable for the following components:       Result Value    WBC 2.95 (*)     RBC 3.88 (*)     Mean Corpuscular Hemoglobin 31.4 (*)     Platelets 86 (*)     MPV 13.0 (*)     Gran # (ANC) 1.6 (*)     Lymph # 0.9 (*)     All other  components within normal limits   COMPREHENSIVE METABOLIC PANEL - Abnormal; Notable for the following components:    Sodium 132 (*)     Chloride 91 (*)     CO2 30 (*)     Glucose 117 (*)     BUN, Bld 66 (*)     Creatinine 5.1 (*)     Albumin 3.0 (*)     ALT 7 (*)     eGFR if  8 (*)     eGFR if non  7 (*)     All other components within normal limits   TROPONIN I - Abnormal; Notable for the following components:    Troponin I 0.038 (*)     All other components within normal limits   MAGNESIUM   PHOSPHORUS   URINALYSIS, REFLEX TO URINE CULTURE   B-TYPE NATRIURETIC PEPTIDE   SARS-COV-2 RNA AMPLIFICATION, QUAL   POCT GLUCOSE MONITORING CONTINUOUS          Imaging Results    None          Medical Decision Making:   Differential Diagnosis:   SERGE, ARF, electrolyte derangement, arrhythmia, infection  Clinical Tests:   Lab Tests: Ordered and Reviewed  Radiological Study: Ordered and Reviewed  Medical Tests: Reviewed and Ordered  ED Management:  Labs, EKG, IV fluids    Pt's creat is significantly elevated from baseline at 5.1 (baseline about 2).  Pt is not tachycardic or hypotensive.  Trop elevated likely from SERGE. Pt has no CP or SOB.    Other:   I have discussed this case with another health care provider.       <> Summary of the Discussion: Discussed with Dr.LeFort who agrees with ED course and disposition.                                  Clinical Impression:       ICD-10-CM ICD-9-CM   1. SERGE (acute kidney injury)  N17.9 584.9   2. Acute kidney injury  N17.9 584.9       I have reviewed the notes, assessments, and/or procedures performed by NP, I concur with her/his documentation of Daria Carter.    Disposition:   Disposition: Placed in Observation  Condition: Stable                        Guy J. Lefort, MD  08/06/20 0106       Guy J. Lefort, MD  08/06/20 0106

## 2020-08-06 NOTE — ASSESSMENT & PLAN NOTE
BUN/Cr 66/5.1 on admit (Cr 2.0 on discharge 7/15)   Suspect prerenal as family reports poor appetite and po intake   UA negative, check renal US   Renal dose meds   Strict intake and output   Avoid nephrotoxic meds- Hold ARB and diuretic   Consult nephrology

## 2020-08-06 NOTE — ASSESSMENT & PLAN NOTE
-138   Taking carvedilol, amlodipine and losartan- hold all for now to avoid hypotension in setting of SERGE

## 2020-08-06 NOTE — SUBJECTIVE & OBJECTIVE
Past Medical History:   Diagnosis Date    Blindness of left eye     Dementia     Diabetes mellitus     Hypertension        Past Surgical History:   Procedure Laterality Date    CATARACT EXTRACTION EXTRACAPSULAR W/ INTRAOCULAR LENS IMPLANTATION Bilateral        Review of patient's allergies indicates:  No Known Allergies    No current facility-administered medications on file prior to encounter.      Current Outpatient Medications on File Prior to Encounter   Medication Sig    amLODIPine (NORVASC) 10 MG tablet Take 1 tablet (10 mg total) by mouth once daily.    ascorbic acid, vitamin C, (VITAMIN C) 1000 MG tablet Take 1,000 mg by mouth once daily.    aspirin (ECOTRIN) 81 MG EC tablet Take 81 mg by mouth once daily.    carvedilol (COREG) 25 MG tablet Take 1 tablet (25 mg total) by mouth 2 (two) times daily.    furosemide (LASIX) 40 MG tablet Take 1 tablet (40 mg total) by mouth once daily.    losartan (COZAAR) 25 MG tablet Take 1 tablet (25 mg total) by mouth with lunch.    pantoprazole (PROTONIX) 40 MG tablet Take 1 tablet (40 mg total) by mouth once daily.     Family History     Problem Relation (Age of Onset)    COPD Father    Cancer Mother    Heart disease Mother    Stroke Father        Tobacco Use    Smoking status: Former Smoker    Smokeless tobacco: Never Used   Substance and Sexual Activity    Alcohol use: No    Drug use: No    Sexual activity: Not Currently     Review of Systems   Constitutional: Positive for fatigue. Negative for chills, diaphoresis and fever.   Eyes: Negative for photophobia.   Respiratory: Negative for cough, chest tightness, shortness of breath and wheezing.    Cardiovascular: Negative for chest pain, palpitations and leg swelling.   Gastrointestinal: Negative for abdominal pain, diarrhea, nausea and vomiting.   Genitourinary: Negative for dysuria, flank pain, frequency and hematuria.   Musculoskeletal: Negative for back pain and myalgias.   Neurological: Positive for  weakness. Negative for dizziness, syncope, light-headedness and headaches.   Psychiatric/Behavioral: Negative for confusion.     Objective:     Vital Signs (Most Recent):  Temp: 97.6 °F (36.4 °C) (08/06/20 0422)  Pulse: 75 (08/06/20 0422)  Resp: 19 (08/06/20 0422)  BP: 138/67 (08/06/20 0422)  SpO2: 97 % (08/06/20 0422) Vital Signs (24h Range):  Temp:  [97 °F (36.1 °C)-98.6 °F (37 °C)] 97.6 °F (36.4 °C)  Pulse:  [60-78] 75  Resp:  [19] 19  SpO2:  [97 %-98 %] 97 %  BP: (105-138)/(50-67) 138/67     Weight: 53.5 kg (118 lb)  Body mass index is 18.48 kg/m².    Physical Exam  Constitutional:       Appearance: She is well-developed.   HENT:      Head: Normocephalic and atraumatic.   Eyes:      Conjunctiva/sclera: Conjunctivae normal.      Pupils: Pupils are equal, round, and reactive to light.   Neck:      Musculoskeletal: Normal range of motion.      Vascular: No JVD.   Cardiovascular:      Rate and Rhythm: Normal rate and regular rhythm.      Heart sounds: Normal heart sounds.      Comments: No LE edema   Pulmonary:      Effort: Pulmonary effort is normal. No respiratory distress.      Breath sounds: No wheezing.   Abdominal:      General: Bowel sounds are normal. There is no distension.      Palpations: Abdomen is soft.      Tenderness: There is no abdominal tenderness. There is no guarding.   Musculoskeletal: Normal range of motion.         General: No tenderness.   Skin:     General: Skin is warm and dry.      Capillary Refill: Capillary refill takes less than 2 seconds.   Neurological:      Mental Status: She is alert and oriented to person, place, and time.   Psychiatric:         Behavior: Behavior normal.           CRANIAL NERVES     CN III, IV, VI   Pupils are equal, round, and reactive to light.       Significant Labs:   BMP:   Recent Labs   Lab 08/05/20 2042   *   *   K 4.3   CL 91*   CO2 30*   BUN 66*   CREATININE 5.1*   CALCIUM 9.2   MG 2.1     CBC:   Recent Labs   Lab 08/05/20  1302  08/05/20 2042   WBC 3.21* 2.95*   HGB 11.5* 12.2   HCT 35.7* 37.5   PLT 89* 86*     Urine Studies:   Recent Labs   Lab 08/05/20 2120   COLORU Yellow   APPEARANCEUA Clear   PHUR 6.0   SPECGRAV 1.020   PROTEINUA Negative   GLUCUA Negative   KETONESU Negative   BILIRUBINUA Negative   OCCULTUA Negative   NITRITE Negative   UROBILINOGEN Negative   LEUKOCYTESUR Negative       Significant Imaging: I have reviewed all pertinent imaging results/findings within the past 24 hours.

## 2020-08-10 PROBLEM — Z51.5 HOSPICE CARE: Status: ACTIVE | Noted: 2020-08-10

## 2020-08-17 PROBLEM — I50.42 CHRONIC COMBINED SYSTOLIC AND DIASTOLIC CONGESTIVE HEART FAILURE: Status: ACTIVE | Noted: 2020-07-13

## 2020-08-21 ENCOUNTER — TELEPHONE (OUTPATIENT)
Dept: FAMILY MEDICINE | Facility: CLINIC | Age: 85
End: 2020-08-21

## 2020-08-21 DIAGNOSIS — N17.9 ACUTE RENAL FAILURE SUPERIMPOSED ON STAGE 3 CHRONIC KIDNEY DISEASE, UNSPECIFIED ACUTE RENAL FAILURE TYPE: Primary | ICD-10-CM

## 2020-08-21 DIAGNOSIS — F03.90 DEMENTIA WITHOUT BEHAVIORAL DISTURBANCE, UNSPECIFIED DEMENTIA TYPE: ICD-10-CM

## 2020-08-21 DIAGNOSIS — N18.3 ACUTE RENAL FAILURE SUPERIMPOSED ON STAGE 3 CHRONIC KIDNEY DISEASE, UNSPECIFIED ACUTE RENAL FAILURE TYPE: Primary | ICD-10-CM

## 2020-08-21 DIAGNOSIS — I50.42 CHRONIC COMBINED SYSTOLIC AND DIASTOLIC CONGESTIVE HEART FAILURE: ICD-10-CM

## 2020-08-21 NOTE — TELEPHONE ENCOUNTER
----- Message from Adrienne Mcpherson sent at 8/21/2020  8:35 AM CDT -----  Ms. Hurley, Daughter is calling to speak with Staff regarding the pt's health.  She was in Hospice and was taking out of Hospice and is now having breathing problems.  She is requesting a returned call to [phone].    Thank you.

## 2020-08-21 NOTE — TELEPHONE ENCOUNTER
Spoke with other daughter, Mei did not answer, LMOVM.    Will place referral for [facility] to evaluate.  Patient 100% qualifies for hospice given acute renal failure, worsening dementia and heart failure.  Will place urgent referral to have them evaluate.

## 2020-08-21 NOTE — TELEPHONE ENCOUNTER
----- Message from Ellen Dela Cruz sent at 8/21/2020  2:47 PM CDT -----  Type:  Needs Medical Advice    Who Called:  Mei Win (daughter)  Symptoms (please be specific): unknown   How long has patient had these symptoms:  unknown  Pharmacy name and phone #:  n/a  Would the patient rather a call back or a response via [facility]?  Call back  Best Call Back Number: [phone]  Additional Information:  returning Dr. Atkins's call

## 2020-08-21 NOTE — TELEPHONE ENCOUNTER
Spoke with daughter Mei. She stated that pt was recently in hospital. Daughter stated that when pt was discharged, they put her on hospice with [facility]. Then [facility] called and stated that the pt didn't qualify for hospice. The company came back to the house and picked up all of their equipment, including the oxygen tank. Daughter stated that her mother is in need of oxygen tank. She noticed that pt has been gasping for air. She states that pt is not eating and not able to take her medications. Daughter would like to do next. Daughter stated that we can either call her at [phone] or [phone].    Please advise.

## 2020-08-24 ENCOUNTER — TELEPHONE (OUTPATIENT)
Dept: FAMILY MEDICINE | Facility: CLINIC | Age: 85
End: 2020-08-24

## 2020-08-24 DIAGNOSIS — N18.3 ACUTE RENAL FAILURE SUPERIMPOSED ON STAGE 3 CHRONIC KIDNEY DISEASE, UNSPECIFIED ACUTE RENAL FAILURE TYPE: Primary | ICD-10-CM

## 2020-08-24 DIAGNOSIS — Z51.5 HOSPICE CARE: ICD-10-CM

## 2020-08-24 DIAGNOSIS — F03.90 DEMENTIA WITHOUT BEHAVIORAL DISTURBANCE, UNSPECIFIED DEMENTIA TYPE: ICD-10-CM

## 2020-08-24 DIAGNOSIS — N17.9 ACUTE RENAL FAILURE SUPERIMPOSED ON STAGE 3 CHRONIC KIDNEY DISEASE, UNSPECIFIED ACUTE RENAL FAILURE TYPE: Primary | ICD-10-CM

## 2020-08-24 DIAGNOSIS — I50.42 CHRONIC COMBINED SYSTOLIC AND DIASTOLIC CONGESTIVE HEART FAILURE: ICD-10-CM

## 2020-08-24 NOTE — TELEPHONE ENCOUNTER
Spoke with [facility], given number for [facility], they do not cover this area.  Spoke with hospice [facility] on call.  Faxing order to [facility] for hospice.  Awaiting call from admit nurse to initiate intake process.

## 2020-08-24 NOTE — TELEPHONE ENCOUNTER
Patient daughter Mei called and informed of [facility] hosp[facility] referral and Mei states patient is having trouble breathing and [facility] took the oxygen tank back so the patient does not have oxygen.